# Patient Record
Sex: FEMALE | Race: WHITE | NOT HISPANIC OR LATINO | Employment: PART TIME | URBAN - METROPOLITAN AREA
[De-identification: names, ages, dates, MRNs, and addresses within clinical notes are randomized per-mention and may not be internally consistent; named-entity substitution may affect disease eponyms.]

---

## 2019-12-31 ENCOUNTER — CONSULT (OUTPATIENT)
Dept: PAIN MEDICINE | Facility: CLINIC | Age: 54
End: 2019-12-31
Payer: COMMERCIAL

## 2019-12-31 VITALS
DIASTOLIC BLOOD PRESSURE: 82 MMHG | HEART RATE: 72 BPM | WEIGHT: 279 LBS | HEIGHT: 67 IN | SYSTOLIC BLOOD PRESSURE: 122 MMHG | BODY MASS INDEX: 43.79 KG/M2

## 2019-12-31 DIAGNOSIS — M47.816 LUMBAR SPONDYLOSIS: ICD-10-CM

## 2019-12-31 DIAGNOSIS — M51.26 BULGE OF LUMBAR DISC WITHOUT MYELOPATHY: ICD-10-CM

## 2019-12-31 DIAGNOSIS — G89.29 CHRONIC BILATERAL LOW BACK PAIN WITHOUT SCIATICA: ICD-10-CM

## 2019-12-31 DIAGNOSIS — M54.16 LUMBAR RADICULOPATHY: ICD-10-CM

## 2019-12-31 DIAGNOSIS — G89.4 CHRONIC PAIN SYNDROME: Primary | ICD-10-CM

## 2019-12-31 DIAGNOSIS — M54.50 CHRONIC BILATERAL LOW BACK PAIN WITHOUT SCIATICA: ICD-10-CM

## 2019-12-31 PROBLEM — M51.36 BULGE OF LUMBAR DISC WITHOUT MYELOPATHY: Status: ACTIVE | Noted: 2019-12-31

## 2019-12-31 PROBLEM — M51.369 BULGE OF LUMBAR DISC WITHOUT MYELOPATHY: Status: ACTIVE | Noted: 2019-12-31

## 2019-12-31 PROCEDURE — 99244 OFF/OP CNSLTJ NEW/EST MOD 40: CPT | Performed by: NURSE PRACTITIONER

## 2019-12-31 RX ORDER — TRAMADOL HYDROCHLORIDE 50 MG/1
TABLET ORAL
COMMUNITY
Start: 2019-12-24 | End: 2020-08-26

## 2019-12-31 RX ORDER — CYCLOBENZAPRINE HCL 10 MG
TABLET ORAL
COMMUNITY
Start: 2019-02-14 | End: 2019-12-31

## 2019-12-31 RX ORDER — LEVOTHYROXINE SODIUM 88 UG/1
CAPSULE ORAL
COMMUNITY

## 2019-12-31 RX ORDER — GABAPENTIN 300 MG/1
CAPSULE ORAL
Qty: 90 CAPSULE | Refills: 1 | Status: SHIPPED | OUTPATIENT
Start: 2019-12-31 | End: 2020-02-18 | Stop reason: SDUPTHER

## 2019-12-31 RX ORDER — LISINOPRIL 20 MG/1
20 TABLET ORAL DAILY
COMMUNITY

## 2019-12-31 RX ORDER — SPIRONOLACTONE 100 MG/1
100 TABLET, FILM COATED ORAL DAILY
COMMUNITY

## 2019-12-31 RX ORDER — DOXYCYCLINE HYCLATE 100 MG/1
100 TABLET, DELAYED RELEASE ORAL 2 TIMES DAILY
COMMUNITY

## 2019-12-31 RX ORDER — ESCITALOPRAM OXALATE 20 MG/1
20 TABLET ORAL DAILY
COMMUNITY

## 2019-12-31 RX ORDER — IBUPROFEN 800 MG/1
TABLET ORAL EVERY 6 HOURS PRN
COMMUNITY

## 2019-12-31 NOTE — PATIENT INSTRUCTIONS
Gabapentin (By mouth)   Gabapentin (laly-a-PEN-tin)  Treats seizures and pain caused by shingles  Brand Name(s): ACTIVE-PAC with Gabapentin, Convenience Perry, Cyclo/Anna 10/300 Pack, FusePaq Fanatrex, Anna-V, Gralise, [address] Pack, Neurontin, SmartRx Anna Kit   There may be other brand names for this medicine  When This Medicine Should Not Be Used: This medicine is not right for everyone  Do not use it if you had an allergic reaction to gabapentin  How to Use This Medicine:   Capsule, Liquid, Tablet  · Take your medicine as directed  Your dose may need to be changed several times to find what works best for you  If you have epilepsy, do not allow more than 12 hours to pass between doses  · Capsule: Swallow the capsule whole with plenty of water  Do not open, crush, or chew it  · Gralise® tablet: Swallow the tablet whole   Do not crush, break, or chew it  · Neurontin® tablet: If you break a tablet into 2 pieces, use the second half as your next dose  If you don't use it within 28 days, throw it away  · Measure the oral liquid medicine with a marked measuring spoon, oral syringe, or medicine cup  · This medicine should come with a Medication Guide  Ask your pharmacist for a copy if you do not have one  · Missed dose: Take a dose as soon as you remember  If it is almost time for your next dose, wait until then and take a regular dose  Do not take extra medicine to make up for a missed dose  · Store the medicine in a closed container at room temperature, away from heat, moisture, and direct light  Store the Neurontin® oral liquid in the refrigerator  Do not freeze  Drugs and Foods to Avoid:   Ask your doctor or pharmacist before using any other medicine, including over-the-counter medicines, vitamins, and herbal products  · Some medicines can affect how gabapentin works   Tell your doctor if you also use any of the following:   ¨ Hydrocodone  ¨ Morphine  · If you take an antacid, wait at least 2 hours before you take gabapentin  · Tell your doctor if you use anything else that makes you sleepy  Some examples are allergy medicine, narcotic pain medicine, and alcohol  Warnings While Using This Medicine:   · Tell your doctor if you are pregnant or breastfeeding, or if you have kidney problems or are receiving dialysis  Tell your doctor if you have a history of depression or mental health problems  · This medicine may increase depression or thoughts of suicide  Tell your doctor right away if you start to feel more depressed or think about hurting yourself  · This medicine may cause a serious allergic reaction called multiorgan hypersensitivity, which can damage organs and be life-threatening  · Do not stop using this medicine suddenly  Your doctor will need to slowly decrease your dose before you stop it completely  If you take this medicine to prevent seizures, your seizures may return or occur more often if you stop this medicine suddenly  · This medicine may make you dizzy or drowsy  Do not drive or do anything else that could be dangerous until you know how this medicine affects you  · Tell any doctor or dentist who treats you that you are using this medicine  This medicine may affect certain medical test results  · Your doctor will check your progress and the effects of this medicine at regular visits  Keep all appointments  · Keep all medicine out of the reach of children  Never share your medicine with anyone    Possible Side Effects While Using This Medicine:   Call your doctor right away if you notice any of these side effects:  · Allergic reaction: Itching or hives, swelling in your face or hands, swelling or tingling in your mouth or throat, chest tightness, trouble breathing  · Behavior problems, aggression, restlessness, trouble concentrating, moodiness (especially in children)  · Blistering, peeling, red skin rash  · Change in how much or how often you urinate, bloody or cloudy urine,  · Chest pain, fast heartbeat, trouble breathing  · Dark urine or pale stools, nausea, vomiting, loss of appetite, stomach pain, yellow skin or eyes  · Fever, rash, swollen or tender glands in the neck, armpit, or groin  · Problems with coordination, shakiness, unsteadiness  · Rapid weight gain, swelling in your hands, ankles, or feet  · Unusual moods or behaviors, thoughts of hurting yourself, feeling depressed  If you notice these less serious side effects, talk with your doctor:   · Dizziness, drowsiness, sleepiness, tiredness  If you notice other side effects that you think are caused by this medicine, tell your doctor  Call your doctor for medical advice about side effects  You may report side effects to FDA at 2-427-FDA-9917  © 2017 [employer] Information is for End User's use only and may not be sold, redistributed or otherwise used for commercial purposes  The above information is an  only  It is not intended as medical advice for individual conditions or treatments  Talk to your doctor, nurse or pharmacist before following any medical regimen to see if it is safe and effective for you

## 2019-12-31 NOTE — PROGRESS NOTES
Assessment:  1  Chronic pain syndrome    2  Chronic bilateral low back pain without sciatica    3  Lumbar spondylosis    4  Bulge of lumbar disc without myelopathy    5  Lumbar radiculopathy        Plan:  While the patient was in the office today, I did have a thorough conversation with the patient regarding her chronic pain syndrome, symptoms, medication regimen, and treatment plan options  I did explain to the patient that at this point I am highly suspicious all multitude of her back pain as that is her biggest issue at this point is mostly coming from the underlying osteoarthritic component from the facet joints  The patient's low back pain persists despite time, relative rest, activity modification and therapy  Based on the patient's symptoms examination, I suspect that the pain is being generated by the lumbar facet joints  The facet joints are only one of many possible low-back pain generators  Unfortunately, studies have demonstrated that history and examination alone are unreliable  We will schedule the patient for diagnostic lumbar medial branch blockade using the double block paradigm  If the patient receives significant relief of appropriate duration with lidocaine 2%, we will confirm with Marcaine 0 75%  If the patient demonstrates appropriate response to medial branch blockade we will schedule for radiofrequency ablation to provide long-term relief  However, at this point the patient want to take home some information regarding the medial branch blocks and think about it as a possible future treatment option  In the meantime, I did explain to the patient that I feel there is a significant neuropathic and myofascial component to her pain symptoms and that she would benefit from a neuron membrane stabilizer such as gabapentin   I discussed with the patient the type of medication it is, how it works, and that it requires a titration process that is specific to each individual  I reviewed with the patient that it may take 3-4 weeks for the medication's effects to be noticed and that it should never be abruptly stopped  Possible side effects include but are not limited to; vertigo, lethargy, nausea, and edema of the extremities  Advised the patient to call our office if they experience any side effects  The patient verbalized an understanding  I advised the patient that she can continue to take the ibuprofen and tramadol as prescribed by her primary care provider, however, the hope is that once we get her on a stable dose of the gabapentin, she will not need the tramadol and only use the ibuprofen for flare up or breakthrough pain occasionally  The patient was agreeable and verbalized an understanding  I did encourage the patient to continue with her home exercise and stretching program and advised her to increase her activity, as tolerated, allowing pain be her guide  The patient was agreeable and verbalized an understanding  The patient is schedule a follow-up office visit in 6 weeks and at that point time we will re-evaluate her pain symptoms and treatment plan options  The patient was agreeable and verbalized an understanding  History of Present Illness: The patient is a 53 y o  female who presents for consultation in regards to Back Pain and Leg Pain  Symptoms have been present for approximately 20 years  Symptoms began without any precipitating injury or trauma  The patient reports that over the years with the different jobs that she has done and everything that is going on she has had on again off again chronic low back and leg pain and in the past has seen pain management without any significant or long-lasting relief and is even had a surgical evaluation with no good guarantee he of considerable pain relief  She does report she recently has completed a formalized 6 weeks of physical therapy with minimal improvement but is trying to be diligent about continuing with a home exercise and stretching program     Pain is reported to be 8 on the numeric rating scale  Symptoms are felt nearly constantly and worst in the morning, evening  Symptoms are characterized as shooting, sharp and dull/aching  Symptoms are associated with bilateral leg weakness  Aggravating factors include standing, bending, leaning forward, leaning bckward, walking, exercise and bowel movements  Relieving factors include: None  No change in symptoms with lying down, sitting, relaxation and coughing/sneezing  Treatments that have been helpful include prior injections including lumbar epidural steroid injections  , physical therapy, chiropractic manipulation and heat/ice  Medications to relieve symptoms include ibuprofen 800 mg b i d  P r n  for pain, tramadol 50 mg b i d  P r n  For pain, and cyclobenzaprine 10 mg half to a whole tablet t i d  P r n  For pain and spasm  The patient reports that she has not really been taking the cyclobenzaprine because she does not feel it is helpful and if she takes the tramadol, she is only taking it at nighttime to help her sleep  She reports that what she is using the most is ibuprofen typically only twice a day but if she could take it 3 times a day she would probably have better relief, but does know that is not good for her stomach and her kidneys take ibuprofen and such large doses for subtle extended period of time  The patient presents today to discuss her medication regimen and treatment plan options to see if there is a better way to manage her pain than what she is currently doing  Review of Systems:    Review of Systems   Constitutional: Negative for fever and unexpected weight change  HENT: Negative for trouble swallowing  Eyes: Negative for visual disturbance  Respiratory: Positive for shortness of breath and wheezing  Cardiovascular: Positive for palpitations  Negative for chest pain     Gastrointestinal: Negative for constipation, diarrhea, nausea and vomiting  Endocrine: Negative for cold intolerance, heat intolerance and polydipsia  Genitourinary: Negative for difficulty urinating and frequency  Musculoskeletal: Positive for gait problem  Negative for arthralgias, joint swelling and myalgias  Skin: Negative for rash  Neurological: Negative for dizziness, seizures, syncope, weakness and headaches  Hematological: Does not bruise/bleed easily  Psychiatric/Behavioral: Negative for dysphoric mood  All other systems reviewed and are negative  Past Medical History:   Diagnosis Date    Asthma     Diabetes ([facility] [address] )     Hypertension        History reviewed  No pertinent surgical history  Family History   Problem Relation Age of Onset    Hypertension Other     Heart disease Other     Cancer Other        Social History     Occupational History    Not on file   Tobacco Use    Smoking status: Never Smoker    Smokeless tobacco: Never Used   Substance and Sexual Activity    Alcohol use: Yes     Frequency: 2-3 times a week    Drug use: Never    Sexual activity: Not on file         Current Outpatient Medications:     cyclobenzaprine (FLEXERIL) 10 mg tablet, daily as needed  , Disp: , Rfl:     doxycycline (DORYX) 100 MG EC tablet, Take 100 mg by mouth 2 (two) times a day, Disp: , Rfl:     escitalopram (LEXAPRO) 20 mg tablet, Take 20 mg by mouth daily, Disp: , Rfl:     ibuprofen (MOTRIN) 800 mg tablet, Take by mouth every 6 (six) hours as needed for mild pain, Disp: , Rfl:     Levothyroxine Sodium 88 MCG CAPS, Take by mouth, Disp: , Rfl:     lisinopril (ZESTRIL) 20 mg tablet, Take 20 mg by mouth daily, Disp: , Rfl:     metFORMIN (GLUCOPHAGE) 500 mg tablet, Take 500 mg by mouth 2 (two) times a day with meals, Disp: , Rfl:     spironolactone (ALDACTONE) 100 mg tablet, Take 100 mg by mouth daily, Disp: , Rfl:     traMADol (ULTRAM) 50 mg tablet, , Disp: , Rfl:     gabapentin (NEURONTIN) 300 mg capsule, Take 1 PO HS x 1 week, then 2 PO HS x 1 week, then 3 PO HS , Disp: 90 capsule, Rfl: 1    Allergies   Allergen Reactions    Cefazolin        Physical Exam:    /82 (BP Location: Left arm, Patient Position: Sitting, Cuff Size: Large)   Pulse 72   Ht 5' 7" (1 702 m)   Wt 127 kg (279 lb)   BMI 43 70 kg/m²     Constitutional: normal, well developed, well nourished, alert, in no distress and non-toxic and no overt pain behavior  and overweight  Eyes: anicteric  HEENT: grossly intact  Neck: supple, symmetric, trachea midline and no masses   Pulmonary:even and unlabored  Cardiovascular:No edema or pitting edema present  Skin:Normal without rashes or lesions and well hydrated  Psychiatric:Mood and affect appropriate  Neurologic:Cranial Nerves II-XII grossly intact  Musculoskeletal:The patient's gait is slightly antalgic, but steady without the use of any assistive devices      Lumbar Spine Exam    Appearance:  Normal lordosis  Palpation/Tenderness:  left lumbar paraspinal tenderness  right lumbar paraspinal tenderness  left sacroiliac joint tenderness  right sacroiliac joint tenderness  Sensory:  no sensory deficits noted  Range of Motion:  Flexion:  Minimally limited  without pain  Extension:  Minimally limited  with pain  Rotation - Left:  Minimally limited  with pain  Rotation - Right:  Minimally limited  with pain  Motor Strength:  Left hip flexion:  5/5  Left hip extension:  5/5  Right hip flexion:  5/5  Right hip extension:  5/5  Left knee flexion:  5/5  Left knee extension:  5/5  Right knee flexion:  5/5  Right knee extension:  5/5  Left foot dorsiflexion:  5/5  Left foot plantar flexion:  5/5  Right foot dorsiflexion:  5/5  Right foot plantar flexion:  5/5  Reflexes:  Left Patellar:  1+   Right Patellar:  1+   Left Achilles:  1+   Right Achilles:  1+   Special Tests:  Left Straight Leg Test:  negative  Right Straight Leg Test:  negative  Left Jin's Maneuver:  negative  Right Jin's Maneuver: negative  Left Gaenslen's Test:  negative  Right Gaenslen's Test:  negative      Imaging    MRI LUMBAR SPINE W/O CONTRAST 8/9/19 @ [facility]  There has been some mild progression of the broad-based central and lateral disc bulge/herniation at the L3-L4 level  In addition there is bilateral degenerative facet disease  Combination results in lateral recess and neural foraminal stenosis, bilaterally, right greater then left  Early degenerative disc disease and a mildly bulging disc L4-L5 level  There is a persistent cluster or single multi septated cyst measuring approximately 3cm at the right ovary  This appears essentially unchanged when compared to the study of 2016  The evaluation of the cystic lesion is limited, on the large field-of-view localizing sequence  Further detailed evaluation could be obtained by follow-up pelvic ultrasound in order to compare to the study of 2016    MRI LUMBAR SPINE W/O CONTRAST 6/22/12 @ [facility]  There is multilevel degenerative disc disease  There is no spinal canal stenosis   There is mild bilateral neural foramen stenosis from L3 through L5

## 2020-02-18 ENCOUNTER — OFFICE VISIT (OUTPATIENT)
Dept: PAIN MEDICINE | Facility: CLINIC | Age: 55
End: 2020-02-18
Payer: COMMERCIAL

## 2020-02-18 VITALS
SYSTOLIC BLOOD PRESSURE: 126 MMHG | HEIGHT: 67 IN | WEIGHT: 270 LBS | HEART RATE: 58 BPM | BODY MASS INDEX: 42.38 KG/M2 | DIASTOLIC BLOOD PRESSURE: 80 MMHG

## 2020-02-18 DIAGNOSIS — G89.29 CHRONIC BILATERAL LOW BACK PAIN WITHOUT SCIATICA: ICD-10-CM

## 2020-02-18 DIAGNOSIS — M51.26 BULGE OF LUMBAR DISC WITHOUT MYELOPATHY: ICD-10-CM

## 2020-02-18 DIAGNOSIS — M54.50 CHRONIC BILATERAL LOW BACK PAIN WITHOUT SCIATICA: ICD-10-CM

## 2020-02-18 DIAGNOSIS — M54.16 LUMBAR RADICULOPATHY: ICD-10-CM

## 2020-02-18 DIAGNOSIS — M47.816 LUMBAR SPONDYLOSIS: ICD-10-CM

## 2020-02-18 DIAGNOSIS — G89.4 CHRONIC PAIN SYNDROME: Primary | ICD-10-CM

## 2020-02-18 PROCEDURE — 99214 OFFICE O/P EST MOD 30 MIN: CPT | Performed by: NURSE PRACTITIONER

## 2020-02-18 RX ORDER — GABAPENTIN 300 MG/1
CAPSULE ORAL
Qty: 90 CAPSULE | Refills: 1 | Status: SHIPPED | OUTPATIENT
Start: 2020-02-18 | End: 2020-04-20 | Stop reason: SDUPTHER

## 2020-02-18 NOTE — PROGRESS NOTES
Assessment:  1  Chronic pain syndrome    2  Chronic bilateral low back pain without sciatica    3  Lumbar radiculopathy    4  Lumbar spondylosis    5  Bulge of lumbar disc without myelopathy        Plan:  While the patient was in the office today, I did have a thorough conversation with the patient regarding their chronic low back pain syndrome, symptoms, medication regimen, and treatment plan  The patient's low back pain persists despite time, relative rest, activity modification and therapy  Based on the patient's symptoms examination, I suspect that the pain is being generated by the lumbar facet joints  The facet joints are only one of many possible low-back pain generators  Unfortunately, studies have demonstrated that history and examination alone are unreliable  We will schedule the patient for diagnostic lumbar medial branch blockade using the double block paradigm  If the patient receives significant relief of appropriate duration with lidocaine 2%, we will confirm with Marcaine 0 75%  If the patient demonstrates appropriate response to medial branch blockade we will schedule for radiofrequency ablation to provide long-term relief  Complete risks and benefits including bleeding, infection, tissue reaction, nerve injury and allergic reaction were discussed  The approach was demonstrated using models and literature was provided  Verbal and written consent was obtained  With regards to her medications, I discussed with the patient at this point since I agree it does not sound like she is having any side effects and currently denies actual suicidal ideations or thoughts, I feel it is reasonable to continue the gabapentin as prescribed for now because it is providing moderate improvement of her pain symptoms  The patient was agreeable and verbalized an understanding  The patient will follow-up in 8 weeks for medication prescription refill and reevaluation   The patient was advised to contact the office should their symptoms worsen in the interim  The patient was agreeable and verbalized an understanding  History of Present Illness: The patient is a 53 y o  female last seen on 12/31/19 who presents for a follow up office visit in regards to chronic pain syndrome secondary to lumbar spondylosis, with a bulging lumbar disc and radiculopathy  The patient currently reports that since her last office visit overall she does feel that her pain symptoms have slightly improved as she does feel that the addition of the gabapentin has been helpful as her pain is not as severe as it was, especially the leg pain  However, she does continue with the chronic low back pain and since her last office visit and she has continued to review the previously discussed lumbar medial branch blocks at this point would like to continue the gabapentin and proceed with the previously discussed medial branch blocks  Current pain medications includes:  Gabapentin 900 mg daily  The patient reports that this regimen is providing 50% pain relief  The patient is reporting sleepiness from this pain medication regimen  She does report that although she is not having actual suicidal thoughts or ideations where she wants to kill herself or hurt herself, she does feel that since starting the gabapentin she has been thinking about her own death more with regards to where she would be buried and the whole process, however, currently denies any suicidal thoughts, ideations, or plans  She just reports that she noticed she has been thinking about her own death and end of her life more than usual and is not fully convinced it is because of the gabapentin as she would like to continue to take it because it is helping her pain  I have personally reviewed and/or updated the patient's past medical history, past surgical history, family history, social history, current medications, allergies, and vital signs today         Review of Systems:    Review of Systems   Respiratory: Negative for shortness of breath  Cardiovascular: Negative for chest pain  Gastrointestinal: Negative for constipation, diarrhea, nausea and vomiting  Musculoskeletal: Negative for arthralgias, gait problem, joint swelling and myalgias  Skin: Negative for rash  Neurological: Negative for dizziness, seizures and weakness  All other systems reviewed and are negative  Past Medical History:   Diagnosis Date    Asthma     Diabetes ([facility] [address] )     Hypertension        History reviewed  No pertinent surgical history      Family History   Problem Relation Age of Onset    Hypertension Other     Heart disease Other     Cancer Other        Social History     Occupational History    Not on file   Tobacco Use    Smoking status: Never Smoker    Smokeless tobacco: Never Used   Substance and Sexual Activity    Alcohol use: Yes     Frequency: 2-3 times a week    Drug use: Never    Sexual activity: Not on file         Current Outpatient Medications:     doxycycline (DORYX) 100 MG EC tablet, Take 100 mg by mouth 2 (two) times a day, Disp: , Rfl:     escitalopram (LEXAPRO) 20 mg tablet, Take 20 mg by mouth daily, Disp: , Rfl:     gabapentin (NEURONTIN) 300 mg capsule, Take 3 PO HS , Disp: 90 capsule, Rfl: 1    ibuprofen (MOTRIN) 800 mg tablet, Take by mouth every 6 (six) hours as needed for mild pain, Disp: , Rfl:     Levothyroxine Sodium 88 MCG CAPS, Take by mouth, Disp: , Rfl:     lisinopril (ZESTRIL) 20 mg tablet, Take 20 mg by mouth daily, Disp: , Rfl:     metFORMIN (GLUCOPHAGE) 500 mg tablet, Take 500 mg by mouth 2 (two) times a day with meals, Disp: , Rfl:     spironolactone (ALDACTONE) 100 mg tablet, Take 100 mg by mouth daily, Disp: , Rfl:     traMADol (ULTRAM) 50 mg tablet, , Disp: , Rfl:     Allergies   Allergen Reactions    Cefazolin        Physical Exam:    /80 (BP Location: Left arm, Patient Position: Sitting, Cuff Size: Standard)   Pulse 58   Ht 5' 7" (1 702 m)   Wt 122 kg (270 lb)   BMI 42 29 kg/m²     Constitutional:normal, well developed, well nourished, alert, in no distress and non-toxic and no overt pain behavior  and overweight  Eyes:anicteric  HEENT:grossly intact  Neck:supple, symmetric, trachea midline and no masses   Pulmonary:even and unlabored  Cardiovascular:No edema or pitting edema present  Skin:Normal without rashes or lesions and well hydrated  Psychiatric:Mood and affect appropriate  Neurologic:Cranial Nerves II-XII grossly intact  Musculoskeletal:The patient's gait is slightly antalgic, but steady without the use of any assistive devices    Pain was noted upon exam of the lumbosacral spine with extension greater than flexion and pain noted bilaterally in the L3 through L5 facet joints with facet loading upon exam       Imaging  FL spine and pain procedure    (Results Pending)         Orders Placed This Encounter   Procedures    FL spine and pain procedure

## 2020-02-18 NOTE — PATIENT INSTRUCTIONS
Lumbar Radiofrequency Ablation   WHAT YOU NEED TO KNOW:   What do I need to know about lumbar radiofrequency ablation? Lumbar radiofrequency ablation (RFA) is a procedure used to treat facet joint pain in your lower back  Facet joints are found at the back of each vertebra  A needle electrode is used to send electrical currents to the nerves in your facet joint  The electrical currents create heat that damages the nerve so it cannot send pain signals  How do I prepare for lumbar RFA? Your healthcare provider will talk to you about how to prepare for this procedure  He may tell you not to eat or drink anything after midnight on the day of your procedure  He will tell you what medicines to take or not take on the day of your procedure  What will happen during lumbar RFA? · You will lie on your stomach  You will be given local anesthesia to numb the area of your back where the needle electrode will be inserted  You may be given a sedative to help keep you relaxed  You may still feel pressure or pushing during the procedure, but you should not feel any pain  Your healthcare provider will use fluoroscopy (a type of x-ray) to guide the needle electrode to the nerves near your facet joint  · Your healthcare provider may touch the affected nerve to make sure the needle electrode is in the right place  You will feel tingling or pressure when he does this  He will then apply local anesthesia to the nerve to numb it  This will prevent you from feeling pain when he applies heat to the nerve  Your healthcare provider will then apply heat to the nerve using the needle electrode  He may need to apply heat to more than one nerve  He will remove the needle electrode and apply a bandage over the area  What are the risks of lumbar RFA? You may have pain, numbness, tingling, or burning in the area where the lumbar RFA was done  These normally go away within 6 weeks  The needle electrode may injure your spinal nerves   This may cause permanent leg weakness or nerve pain  CARE AGREEMENT:   You have the right to help plan your care  Learn about your health condition and how it may be treated  Discuss treatment options with your caregivers to decide what care you want to receive  You always have the right to refuse treatment  The above information is an  only  It is not intended as medical advice for individual conditions or treatments  Talk to your doctor, nurse or pharmacist before following any medical regimen to see if it is safe and effective for you  © 2017 [employer]  Information is for End User's use only and may not be sold, redistributed or otherwise used for commercial purposes  All illustrations and images included in CareNotes® are the copyrighted property of [employer]  or [employer]

## 2020-02-28 ENCOUNTER — HOSPITAL ENCOUNTER (OUTPATIENT)
Dept: RADIOLOGY | Facility: CLINIC | Age: 55
Discharge: HOME | End: 2020-02-28
Admitting: ANESTHESIOLOGY
Payer: COMMERCIAL

## 2020-02-28 VITALS
RESPIRATION RATE: 20 BRPM | OXYGEN SATURATION: 98 % | SYSTOLIC BLOOD PRESSURE: 130 MMHG | HEART RATE: 96 BPM | TEMPERATURE: 97.9 F | DIASTOLIC BLOOD PRESSURE: 91 MMHG

## 2020-02-28 DIAGNOSIS — G89.29 CHRONIC BILATERAL LOW BACK PAIN WITHOUT SCIATICA: ICD-10-CM

## 2020-02-28 DIAGNOSIS — M47.816 LUMBAR SPONDYLOSIS: ICD-10-CM

## 2020-02-28 DIAGNOSIS — M54.50 CHRONIC BILATERAL LOW BACK PAIN WITHOUT SCIATICA: ICD-10-CM

## 2020-02-28 PROCEDURE — 64493 INJ PARAVERT F JNT L/S 1 LEV: CPT | Performed by: ANESTHESIOLOGY

## 2020-02-28 PROCEDURE — 64494 INJ PARAVERT F JNT L/S 2 LEV: CPT | Performed by: ANESTHESIOLOGY

## 2020-02-28 RX ORDER — BUPIVACAINE HCL/PF 2.5 MG/ML
10 VIAL (ML) INJECTION ONCE
Status: COMPLETED | OUTPATIENT
Start: 2020-02-28 | End: 2020-02-28

## 2020-02-28 RX ADMIN — BUPIVACAINE HYDROCHLORIDE 6 ML: 2.5 INJECTION, SOLUTION EPIDURAL; INFILTRATION; INTRACAUDAL at 10:34

## 2020-02-28 NOTE — H&P
History of Present Illness: The patient is a 53 y o  female who presents with complaints of low back pain  Patient Active Problem List   Diagnosis    Chronic bilateral low back pain without sciatica    Chronic pain syndrome    Lumbar spondylosis    Bulge of lumbar disc without myelopathy    Lumbar radiculopathy       Past Medical History:   Diagnosis Date    Asthma     Diabetes ([address] )     Hypertension        No past surgical history on file  Current Outpatient Medications:     doxycycline (DORYX) 100 MG EC tablet, Take 100 mg by mouth 2 (two) times a day, Disp: , Rfl:     escitalopram (LEXAPRO) 20 mg tablet, Take 20 mg by mouth daily, Disp: , Rfl:     gabapentin (NEURONTIN) 300 mg capsule, Take 3 PO HS , Disp: 90 capsule, Rfl: 1    ibuprofen (MOTRIN) 800 mg tablet, Take by mouth every 6 (six) hours as needed for mild pain, Disp: , Rfl:     Levothyroxine Sodium 88 MCG CAPS, Take by mouth, Disp: , Rfl:     lisinopril (ZESTRIL) 20 mg tablet, Take 20 mg by mouth daily, Disp: , Rfl:     metFORMIN (GLUCOPHAGE) 500 mg tablet, Take 500 mg by mouth 2 (two) times a day with meals, Disp: , Rfl:     spironolactone (ALDACTONE) 100 mg tablet, Take 100 mg by mouth daily, Disp: , Rfl:     traMADol (ULTRAM) 50 mg tablet, , Disp: , Rfl:     Current Facility-Administered Medications:     bupivacaine (PF) (MARCAINE) 0 25 % injection 10 mL, 10 mL, Perineural, Once, Darío Walls DO    Allergies   Allergen Reactions    Cefazolin        Physical Exam:   General: Awake, Alert, Oriented x 3, Mood and affect appropriate  Respiratory: Respirations even and unlabored  Cardiovascular: Peripheral pulses intact; no edema  Musculoskeletal Exam:  Pain with lumbar extension    ASA Score: II         Assessment:   1  Chronic bilateral low back pain without sciatica    2   Lumbar spondylosis        Plan: B/L L3-L5 MBB

## 2020-02-28 NOTE — DISCHARGE INSTRUCTIONS

## 2020-03-02 ENCOUNTER — TELEPHONE (OUTPATIENT)
Dept: RADIOLOGY | Facility: CLINIC | Age: 55
End: 2020-03-02

## 2020-03-03 NOTE — TELEPHONE ENCOUNTER
Pt rtc and scheduled for 3/16/2020, reviewed all pre-procedural instructions with pt and pt verbalized understanding

## 2020-03-16 ENCOUNTER — HOSPITAL ENCOUNTER (OUTPATIENT)
Dept: RADIOLOGY | Facility: CLINIC | Age: 55
Discharge: HOME | End: 2020-03-16
Attending: ANESTHESIOLOGY | Admitting: ANESTHESIOLOGY
Payer: COMMERCIAL

## 2020-03-16 VITALS
DIASTOLIC BLOOD PRESSURE: 84 MMHG | HEART RATE: 69 BPM | OXYGEN SATURATION: 98 % | SYSTOLIC BLOOD PRESSURE: 138 MMHG | RESPIRATION RATE: 20 BRPM | TEMPERATURE: 98.1 F

## 2020-03-16 DIAGNOSIS — M54.50 LOW BACK PAIN: ICD-10-CM

## 2020-03-16 DIAGNOSIS — M47.816 LUMBAR SPONDYLOSIS: ICD-10-CM

## 2020-03-16 PROCEDURE — 64493 INJ PARAVERT F JNT L/S 1 LEV: CPT | Performed by: ANESTHESIOLOGY

## 2020-03-16 PROCEDURE — 64494 INJ PARAVERT F JNT L/S 2 LEV: CPT | Performed by: ANESTHESIOLOGY

## 2020-03-16 RX ORDER — BUPIVACAINE HYDROCHLORIDE 7.5 MG/ML
10 INJECTION, SOLUTION EPIDURAL; RETROBULBAR ONCE
Status: COMPLETED | OUTPATIENT
Start: 2020-03-16 | End: 2020-03-16

## 2020-03-16 RX ADMIN — BUPIVACAINE HYDROCHLORIDE 6 ML: 7.5 INJECTION, SOLUTION EPIDURAL; RETROBULBAR at 11:06

## 2020-03-16 NOTE — DISCHARGE INSTRUCTIONS

## 2020-03-16 NOTE — H&P
History of Present Illness: The patient is a 53 y o  female who presents with complaints of  Low back  pain  Patient Active Problem List   Diagnosis    Chronic bilateral low back pain without sciatica    Chronic pain syndrome    Lumbar spondylosis    Bulge of lumbar disc without myelopathy    Lumbar radiculopathy       Past Medical History:   Diagnosis Date    Asthma     Diabetes ([address] )     Hypertension        No past surgical history on file        Current Outpatient Medications:     doxycycline (DORYX) 100 MG EC tablet, Take 100 mg by mouth 2 (two) times a day, Disp: , Rfl:     escitalopram (LEXAPRO) 20 mg tablet, Take 20 mg by mouth daily, Disp: , Rfl:     gabapentin (NEURONTIN) 300 mg capsule, Take 3 PO HS , Disp: 90 capsule, Rfl: 1    ibuprofen (MOTRIN) 800 mg tablet, Take by mouth every 6 (six) hours as needed for mild pain, Disp: , Rfl:     Levothyroxine Sodium 88 MCG CAPS, Take by mouth, Disp: , Rfl:     lisinopril (ZESTRIL) 20 mg tablet, Take 20 mg by mouth daily, Disp: , Rfl:     metFORMIN (GLUCOPHAGE) 500 mg tablet, Take 500 mg by mouth 2 (two) times a day with meals, Disp: , Rfl:     spironolactone (ALDACTONE) 100 mg tablet, Take 100 mg by mouth daily, Disp: , Rfl:     traMADol (ULTRAM) 50 mg tablet, , Disp: , Rfl:     Current Facility-Administered Medications:     bupivacaine (PF) (MARCAINE) 0 75 % injection 10 mL, 10 mL, Other, Once, Darío Walls,     Allergies   Allergen Reactions    Cefazolin        Physical Exam:   Vitals:    03/16/20 1058   BP: 137/85   Pulse: 66   Resp: 20   Temp: 98 1 °F (36 7 °C)   SpO2: 98%     General: Awake, Alert, Oriented x 3, Mood and affect appropriate  Respiratory: Respirations even and unlabored  Cardiovascular: Peripheral pulses intact; no edema  Musculoskeletal Exam: Pain with lumbar extension    ASA Score: III    Patient/Chart Verification  Patient ID Verified: Verbal  ID Band Applied: No  Consents Confirmed: Procedural  H&P( within 30 days) Verified: To be obtained in the Pre-Procedure area  Interval H&P(within 24 hr) Complete (required for Outpatients and Surgery Admit only): To be obtained in the Pre-Procedure area  Allergies Reviewed: Yes  Anticoag/NSAID held?: No  Currently on antibiotics?: No  Pre-op Lab/Test Results Available: N/A  Pregnancy Lab Collected: N/A comment    Assessment:   1  Lumbar spondylosis    2   Low back pain        Plan: B/L L3-L5 MBB #2

## 2020-03-17 ENCOUNTER — TELEPHONE (OUTPATIENT)
Dept: PAIN MEDICINE | Facility: MEDICAL CENTER | Age: 55
End: 2020-03-17

## 2020-03-17 NOTE — TELEPHONE ENCOUNTER
S/w pt, advised that the pain diary has been received  Once it has been reviewed, the pt will get a cb from the nurse or the  to discuss the next step  Pt verbalized understanding and appreciation

## 2020-03-19 ENCOUNTER — TELEPHONE (OUTPATIENT)
Dept: RADIOLOGY | Facility: CLINIC | Age: 55
End: 2020-03-19

## 2020-03-19 NOTE — TELEPHONE ENCOUNTER
S/w pt in regards to RFA procedure being on hold for now, and that once it has been determined to safe to move forward advised pt  will be in contact to schedule her RFA (rt l3-5, then lt l3-5)  Pt verbalized understanding and appreciation

## 2020-03-22 DIAGNOSIS — G89.29 CHRONIC BILATERAL LOW BACK PAIN WITHOUT SCIATICA: ICD-10-CM

## 2020-03-22 DIAGNOSIS — M54.16 LUMBAR RADICULOPATHY: ICD-10-CM

## 2020-03-22 DIAGNOSIS — M54.50 CHRONIC BILATERAL LOW BACK PAIN WITHOUT SCIATICA: ICD-10-CM

## 2020-03-22 RX ORDER — GABAPENTIN 300 MG/1
CAPSULE ORAL
Qty: 90 CAPSULE | Refills: 1 | OUTPATIENT
Start: 2020-03-22

## 2020-04-03 ENCOUNTER — TELEPHONE (OUTPATIENT)
Dept: PAIN MEDICINE | Facility: CLINIC | Age: 55
End: 2020-04-03

## 2020-04-07 DIAGNOSIS — M54.16 LUMBAR RADICULOPATHY: ICD-10-CM

## 2020-04-07 DIAGNOSIS — G89.29 CHRONIC BILATERAL LOW BACK PAIN WITHOUT SCIATICA: ICD-10-CM

## 2020-04-07 DIAGNOSIS — M54.50 CHRONIC BILATERAL LOW BACK PAIN WITHOUT SCIATICA: ICD-10-CM

## 2020-04-07 RX ORDER — GABAPENTIN 300 MG/1
CAPSULE ORAL
Qty: 90 CAPSULE | Refills: 1 | OUTPATIENT
Start: 2020-04-07

## 2020-04-20 ENCOUNTER — TELEMEDICINE (OUTPATIENT)
Dept: PAIN MEDICINE | Facility: CLINIC | Age: 55
End: 2020-04-20
Payer: COMMERCIAL

## 2020-04-20 DIAGNOSIS — G89.4 CHRONIC PAIN SYNDROME: Primary | ICD-10-CM

## 2020-04-20 DIAGNOSIS — M47.816 LUMBAR SPONDYLOSIS: ICD-10-CM

## 2020-04-20 DIAGNOSIS — M54.16 LUMBAR RADICULOPATHY: ICD-10-CM

## 2020-04-20 DIAGNOSIS — M54.50 CHRONIC BILATERAL LOW BACK PAIN WITHOUT SCIATICA: ICD-10-CM

## 2020-04-20 DIAGNOSIS — G89.29 CHRONIC BILATERAL LOW BACK PAIN WITHOUT SCIATICA: ICD-10-CM

## 2020-04-20 DIAGNOSIS — M51.26 BULGE OF LUMBAR DISC WITHOUT MYELOPATHY: ICD-10-CM

## 2020-04-20 PROCEDURE — G2012 BRIEF CHECK IN BY MD/QHP: HCPCS | Performed by: NURSE PRACTITIONER

## 2020-04-20 RX ORDER — GABAPENTIN 300 MG/1
CAPSULE ORAL
Qty: 90 CAPSULE | Refills: 2 | Status: SHIPPED | OUTPATIENT
Start: 2020-04-20 | End: 2020-08-26

## 2020-04-21 ENCOUNTER — TELEPHONE (OUTPATIENT)
Dept: PAIN MEDICINE | Facility: CLINIC | Age: 55
End: 2020-04-21

## 2020-07-10 ENCOUNTER — HOSPITAL ENCOUNTER (OUTPATIENT)
Dept: RADIOLOGY | Facility: CLINIC | Age: 55
Discharge: HOME | End: 2020-07-10
Attending: ANESTHESIOLOGY | Admitting: ANESTHESIOLOGY
Payer: COMMERCIAL

## 2020-07-10 ENCOUNTER — TELEPHONE (OUTPATIENT)
Dept: PAIN MEDICINE | Facility: CLINIC | Age: 55
End: 2020-07-10

## 2020-07-10 VITALS
SYSTOLIC BLOOD PRESSURE: 126 MMHG | OXYGEN SATURATION: 98 % | RESPIRATION RATE: 20 BRPM | HEART RATE: 89 BPM | TEMPERATURE: 98.7 F | DIASTOLIC BLOOD PRESSURE: 70 MMHG

## 2020-07-10 DIAGNOSIS — M47.816 LUMBAR SPONDYLOSIS: ICD-10-CM

## 2020-07-10 DIAGNOSIS — M54.50 LOW BACK PAIN: ICD-10-CM

## 2020-07-10 PROCEDURE — 64635 DESTROY LUMB/SAC FACET JNT: CPT | Performed by: ANESTHESIOLOGY

## 2020-07-10 PROCEDURE — 64636 DESTROY L/S FACET JNT ADDL: CPT | Performed by: ANESTHESIOLOGY

## 2020-07-10 RX ORDER — LIDOCAINE HYDROCHLORIDE 10 MG/ML
5 INJECTION, SOLUTION EPIDURAL; INFILTRATION; INTRACAUDAL; PERINEURAL ONCE
Status: COMPLETED | OUTPATIENT
Start: 2020-07-10 | End: 2020-07-10

## 2020-07-10 RX ADMIN — LIDOCAINE HYDROCHLORIDE 5 ML: 10 INJECTION, SOLUTION EPIDURAL; INFILTRATION; INTRACAUDAL; PERINEURAL at 11:18

## 2020-07-10 RX ADMIN — LIDOCAINE HYDROCHLORIDE 4 ML: 20 INJECTION, SOLUTION EPIDURAL; INFILTRATION; INTRACAUDAL at 11:18

## 2020-07-10 NOTE — DISCHARGE INSTRUCTIONS

## 2020-07-10 NOTE — H&P
History of Present Illness: The patient is a 53 y o  female who presents with complaints of low back pain  Patient Active Problem List   Diagnosis    Chronic bilateral low back pain without sciatica    Chronic pain syndrome    Lumbar spondylosis    Bulge of lumbar disc without myelopathy    Lumbar radiculopathy       Past Medical History:   Diagnosis Date    Asthma     [address] [address]     Hypertension        No past surgical history on file  Current Outpatient Medications:     doxycycline (DORYX) 100 MG EC tablet, Take 100 mg by mouth 2 (two) times a day, Disp: , Rfl:     escitalopram (LEXAPRO) 20 mg tablet, Take 20 mg by mouth daily, Disp: , Rfl:     gabapentin (NEURONTIN) 300 mg capsule, Take 3 PO HS , Disp: 90 capsule, Rfl: 2    ibuprofen (MOTRIN) 800 mg tablet, Take by mouth every 6 (six) hours as needed for mild pain, Disp: , Rfl:     Levothyroxine Sodium 88 MCG CAPS, Take by mouth, Disp: , Rfl:     lisinopril (ZESTRIL) 20 mg tablet, Take 20 mg by mouth daily, Disp: , Rfl:     metFORMIN (GLUCOPHAGE) 500 mg tablet, Take 500 mg by mouth 2 (two) times a day with meals, Disp: , Rfl:     spironolactone (ALDACTONE) 100 mg tablet, Take 100 mg by mouth daily, Disp: , Rfl:     traMADol (ULTRAM) 50 mg tablet, , Disp: , Rfl:     Current Facility-Administered Medications:     lidocaine (PF) (XYLOCAINE-MPF) 1 % injection 5 mL, 5 mL, Other, Once, Darío Walls DO    lidocaine (PF) (XYLOCAINE-MPF) 2 % injection 5 mL, 5 mL, Perineural, Once, Darío Walls DO    Allergies   Allergen Reactions    Cefazolin        Physical Exam:   General: Awake, Alert, Oriented x 3, Mood and affect appropriate  Respiratory: Respirations even and unlabored  Cardiovascular: Peripheral pulses intact; no edema  Musculoskeletal Exam:  Pain with lumbar extension    ASA Score: III         Assessment:   1  Lumbar spondylosis    2   Low back pain        Plan: RT L3-5 RFA

## 2020-07-10 NOTE — TELEPHONE ENCOUNTER
Patient is S/P a Right L3-L5 RFA c/ SL on 7/10/20  Next RFA scheduled for 7/24  Next OVS scheduled for 8/26  Please call on 7/13 post OPRO   Thanks

## 2020-07-13 NOTE — TELEPHONE ENCOUNTER
S/w pt, stated that she has + relief s/p rfa of 7/10/2020  Advised pt to allow 2-6 weeks for the full effect  Cb if s/s infection present: redness, drainage, swelling at the site or fever 100+, or if a sunburn like sensation in the area of the procedure presents  Ok to continue w/ rest, ice / heat, medication as prescribed  Fu on 7/24/2020 for the L sided procedure  Pt stated that she received a phone call from her insurance company stating that her procedure was denied because she got less than 75% improvement with the first 2 procedures  S/w [facility] surg coord earlier  Advised pt, will make SL aware of above and forward her concerns to [address] [place] [address] for fu  Pt verbalized understanding and appreciation

## 2020-07-13 NOTE — TELEPHONE ENCOUNTER
Pt called stating she received a call from insurance regaring her procedure   Pt has a few questions     Pt can be reached at [phone]

## 2020-07-15 ENCOUNTER — TELEPHONE (OUTPATIENT)
Dept: RADIOLOGY | Facility: CLINIC | Age: 55
End: 2020-07-15

## 2020-07-15 NOTE — TELEPHONE ENCOUNTER
[employer] denying patient's RFA because "There is no documentation of at least 75 percent pain relief from your medial branch block injection on 3/10/20"     Both pain diaries faxed over showed % relief  Attempted to call [employer] to reconsider decision, however ordering provider must do so       Phone number: [phone]  Reference# [id number]

## 2020-07-17 DIAGNOSIS — M54.50 CHRONIC BILATERAL LOW BACK PAIN WITHOUT SCIATICA: ICD-10-CM

## 2020-07-17 DIAGNOSIS — G89.29 CHRONIC BILATERAL LOW BACK PAIN WITHOUT SCIATICA: ICD-10-CM

## 2020-07-17 DIAGNOSIS — M54.16 LUMBAR RADICULOPATHY: ICD-10-CM

## 2020-07-17 RX ORDER — GABAPENTIN 300 MG/1
CAPSULE ORAL
Qty: 90 CAPSULE | Refills: 1 | OUTPATIENT
Start: 2020-07-17

## 2020-07-23 NOTE — TELEPHONE ENCOUNTER
Received message from  in [facility] 07/24/20 Dr Jesika Ramsay is off and pts RFA was still on schedule (someone had left 2 messages for her)  Pt says she returned the call this morning and someone at call center confirmed her appt for tomorrow so she was going to come  Pt disappointed we had to reschedule, soonest appt is 08/17/20, advised pt I would let  in [facility] know that she'd like a sooner appt if it becomes available  Pt appreciative of call

## 2020-08-03 DIAGNOSIS — M54.50 CHRONIC BILATERAL LOW BACK PAIN WITHOUT SCIATICA: ICD-10-CM

## 2020-08-03 DIAGNOSIS — G89.29 CHRONIC BILATERAL LOW BACK PAIN WITHOUT SCIATICA: ICD-10-CM

## 2020-08-03 DIAGNOSIS — M54.16 LUMBAR RADICULOPATHY: ICD-10-CM

## 2020-08-03 RX ORDER — GABAPENTIN 300 MG/1
CAPSULE ORAL
Qty: 90 CAPSULE | Refills: 1 | OUTPATIENT
Start: 2020-08-03

## 2020-08-17 ENCOUNTER — TELEPHONE (OUTPATIENT)
Dept: RADIOLOGY | Facility: CLINIC | Age: 55
End: 2020-08-17

## 2020-08-17 ENCOUNTER — HOSPITAL ENCOUNTER (OUTPATIENT)
Dept: RADIOLOGY | Facility: CLINIC | Age: 55
Discharge: HOME | End: 2020-08-17
Attending: ANESTHESIOLOGY | Admitting: ANESTHESIOLOGY
Payer: COMMERCIAL

## 2020-08-17 VITALS
HEART RATE: 94 BPM | DIASTOLIC BLOOD PRESSURE: 86 MMHG | RESPIRATION RATE: 22 BRPM | SYSTOLIC BLOOD PRESSURE: 128 MMHG | OXYGEN SATURATION: 96 % | TEMPERATURE: 97.3 F

## 2020-08-17 DIAGNOSIS — M54.50 LOW BACK PAIN: ICD-10-CM

## 2020-08-17 DIAGNOSIS — M47.816 LUMBAR SPONDYLOSIS: ICD-10-CM

## 2020-08-17 PROCEDURE — 64636 DESTROY L/S FACET JNT ADDL: CPT | Performed by: ANESTHESIOLOGY

## 2020-08-17 PROCEDURE — 64635 DESTROY LUMB/SAC FACET JNT: CPT | Performed by: ANESTHESIOLOGY

## 2020-08-17 RX ORDER — LIDOCAINE HYDROCHLORIDE 10 MG/ML
5 INJECTION, SOLUTION EPIDURAL; INFILTRATION; INTRACAUDAL; PERINEURAL ONCE
Status: COMPLETED | OUTPATIENT
Start: 2020-08-17 | End: 2020-08-17

## 2020-08-17 RX ADMIN — LIDOCAINE HYDROCHLORIDE 5 ML: 10 INJECTION, SOLUTION EPIDURAL; INFILTRATION; INTRACAUDAL; PERINEURAL at 10:57

## 2020-08-17 RX ADMIN — LIDOCAINE HYDROCHLORIDE 4 ML: 20 INJECTION, SOLUTION EPIDURAL; INFILTRATION; INTRACAUDAL at 10:59

## 2020-08-17 NOTE — DISCHARGE INSTRUCTIONS

## 2020-08-17 NOTE — H&P
History of Present Illness: The patient is a 53 y o  female who presents with complaints of low back and leg pain  Patient Active Problem List   Diagnosis    Chronic bilateral low back pain without sciatica    Chronic pain syndrome    Lumbar spondylosis    Bulge of lumbar disc without myelopathy    Lumbar radiculopathy       Past Medical History:   Diagnosis Date    Asthma     Diabetes ([address] )     Hypertension        No past surgical history on file        Current Outpatient Medications:     doxycycline (DORYX) 100 MG EC tablet, Take 100 mg by mouth 2 (two) times a day, Disp: , Rfl:     escitalopram (LEXAPRO) 20 mg tablet, Take 20 mg by mouth daily, Disp: , Rfl:     gabapentin (NEURONTIN) 300 mg capsule, Take 3 PO HS , Disp: 90 capsule, Rfl: 2    ibuprofen (MOTRIN) 800 mg tablet, Take by mouth every 6 (six) hours as needed for mild pain, Disp: , Rfl:     Levothyroxine Sodium 88 MCG CAPS, Take by mouth, Disp: , Rfl:     lisinopril (ZESTRIL) 20 mg tablet, Take 20 mg by mouth daily, Disp: , Rfl:     metFORMIN (GLUCOPHAGE) 500 mg tablet, Take 500 mg by mouth 2 (two) times a day with meals, Disp: , Rfl:     spironolactone (ALDACTONE) 100 mg tablet, Take 100 mg by mouth daily, Disp: , Rfl:     traMADol (ULTRAM) 50 mg tablet, , Disp: , Rfl:     Current Facility-Administered Medications:     lidocaine (PF) (XYLOCAINE-MPF) 1 % injection 5 mL, 5 mL, Other, Once, Darío Walls DO    lidocaine (PF) (XYLOCAINE-MPF) 2 % injection 5 mL, 5 mL, Perineural, Once, Darío Walls DO    Allergies   Allergen Reactions    Cefazolin        Physical Exam:   Vitals:    08/17/20 1047   BP: 138/86   Pulse: 97   Resp: 20   Temp: (!) 97 3 °F (36 3 °C)   SpO2: 98%     General: Awake, Alert, Oriented x 3, Mood and affect appropriate  Respiratory: Respirations even and unlabored  Cardiovascular: Peripheral pulses intact; no edema  Musculoskeletal Exam:  Decreased range of motion lumbar spine    ASA Score: III    Patient/Chart Verification  Patient ID Verified: Verbal  ID Band Applied: No  Consents Confirmed: Procedural  H&P( within 30 days) Verified: To be obtained in the Pre-Procedure area  Interval H&P(within 24 hr) Complete (required for Outpatients and Surgery Admit only): To be obtained in the Pre-Procedure area  Allergies Reviewed: Yes  Anticoag/NSAID held?: NA  Currently on antibiotics?: (S) Yes(long term for Hydronitis)  Pregnancy denied?: NA    Assessment:   1  Lumbar spondylosis    2   Low back pain        Plan: LT L3-5 RFA

## 2020-08-18 NOTE — TELEPHONE ENCOUNTER
S/w pt, stated that she does have some soreness s/t yesterday's procedure  Continues w/ ice  Pt stated that her chronic pain is feeling better  Advised pt to allow 24 - 48 hrs for pain s/t procedure to improve, allow 2-6 weeks for the full effect  Cb if s/s infection present: redness, drainage, swelling at the site or fever 100+, or if a sunburn like sensation in the area of the procedure presents  Ok to continue w/ rest, ice / heat, medication as prescribed  Fu as scheduled on 8/26 w/ DG  Pt verbalized understanding and appreciation

## 2020-08-26 ENCOUNTER — OFFICE VISIT (OUTPATIENT)
Dept: PAIN MEDICINE | Facility: CLINIC | Age: 55
End: 2020-08-26
Payer: COMMERCIAL

## 2020-08-26 VITALS
HEIGHT: 67 IN | WEIGHT: 254 LBS | SYSTOLIC BLOOD PRESSURE: 132 MMHG | BODY MASS INDEX: 39.87 KG/M2 | HEART RATE: 98 BPM | DIASTOLIC BLOOD PRESSURE: 84 MMHG | TEMPERATURE: 98.3 F

## 2020-08-26 DIAGNOSIS — M47.816 LUMBAR SPONDYLOSIS: ICD-10-CM

## 2020-08-26 DIAGNOSIS — M54.16 LUMBAR RADICULOPATHY: ICD-10-CM

## 2020-08-26 DIAGNOSIS — G89.4 CHRONIC PAIN SYNDROME: Primary | ICD-10-CM

## 2020-08-26 DIAGNOSIS — M54.50 CHRONIC BILATERAL LOW BACK PAIN WITHOUT SCIATICA: ICD-10-CM

## 2020-08-26 DIAGNOSIS — M51.26 BULGE OF LUMBAR DISC WITHOUT MYELOPATHY: ICD-10-CM

## 2020-08-26 DIAGNOSIS — G89.29 CHRONIC BILATERAL LOW BACK PAIN WITHOUT SCIATICA: ICD-10-CM

## 2020-08-26 PROCEDURE — 99214 OFFICE O/P EST MOD 30 MIN: CPT | Performed by: NURSE PRACTITIONER

## 2020-08-26 RX ORDER — PREGABALIN 50 MG/1
CAPSULE ORAL
Qty: 90 CAPSULE | Refills: 1 | Status: SHIPPED | OUTPATIENT
Start: 2020-08-26 | End: 2020-10-21 | Stop reason: SDUPTHER

## 2020-08-26 RX ORDER — PREGABALIN 50 MG/1
CAPSULE ORAL
Qty: 90 CAPSULE | Refills: 1 | Status: SHIPPED | OUTPATIENT
Start: 2020-08-26 | End: 2020-08-26 | Stop reason: SDUPTHER

## 2020-08-26 NOTE — PROGRESS NOTES
Assessment:  1  Chronic pain syndrome    2  Chronic bilateral low back pain without sciatica    3  Lumbar radiculopathy    4  Lumbar spondylosis    5  Bulge of lumbar disc without myelopathy        Plan:  While the patient was in the office today, I did have a thorough conversation with the patient regarding their chronic pain syndrome, symptoms, medication regimen, and treatment plan  With regards to her low back pain and the lumbar radiofrequency ablation procedures, since she is already noting minimal to moderate overall relief, I am also hopeful that over the next 4-6 weeks she will see slow and steady improvement and it does appear that it has been somewhat successful  However, for now, we will take a watch and wait approach and see how she does  The patient was agreeable and verbalized an understanding  With regards to her activity levels, I advised the patient that overall for now she is to slowly and steadily increase her activity, as tolerated, allowing pain be her guide  The patient was agreeable and verbalized an understanding  With regards to her medication regimen, since she does not feel the gabapentin is noting any significant overall relief at this point we decided to titrate off of the gabapentin and try different neuropathic medication such as Lyrica  The patient is to titrate off of the gabapentin and on to the Lyrica as discussed  I advised the patient that if they experience any side effects or issues with the changes in their medication regiment, they should give our office a call to discuss  I also advised the patient not to drive or operate machinery until they see how the changes in the medication regimen affects them  The patient was agreeable and verbalized an understanding  The patient will follow-up in 8 weeks for medication prescription refill and reevaluation  The patient was advised to contact the office should their symptoms worsen in the interim   The patient was agreeable and verbalized an understanding  History of Present Illness: The patient is a 53 y o  female last seen on 8/17/2020 who presents for a follow up office visit in regards to chronic pain syndrome secondary to lumbar spondylosis with a bulging disc with radiculopathy  The patient currently reports that since her last office visit as she is status post her left L3 through L5 radiofrequency ablation procedure on August 17, 2020 and the right L3 through L5 radiofrequency ablation procedure in July, she is noting at least 30% improvement in the back pain and is hopeful that over the next few weeks she will see continued improvement but does report that she is already using less ibuprofen as typically she was taking 800 mg t i d  And now feels she is able to only take it b i d  And is hopeful to even get it down to once a day or less  She continues with the gabapentin at bedtime and at this point is not fully convinced how helpful it is and presents today to discuss her medication regimen and treatment plan  Current pain medications includes:  Gabapentin 900 mg at bedtime  The patient reports that this regimen is providing 30% pain relief  The patient is reporting no side effects from this pain medication regimen  I have personally reviewed and/or updated the patient's past medical history, past surgical history, family history, social history, current medications, allergies, and vital signs today  Review of Systems:    Review of Systems   Respiratory: Negative for shortness of breath  Cardiovascular: Negative for chest pain  Gastrointestinal: Negative for constipation, diarrhea, nausea and vomiting  Musculoskeletal: Positive for gait problem  Negative for arthralgias, joint swelling and myalgias  Skin: Negative for rash  Neurological: Negative for dizziness, seizures and weakness  All other systems reviewed and are negative          Past Medical History:   Diagnosis Date    Asthma     Diabetes [address] [address]     Hypertension        History reviewed  No pertinent surgical history  Family History   Problem Relation Age of Onset    Hypertension Other     Heart disease Other     Cancer Other        Social History     Occupational History    Not on file   Tobacco Use    Smoking status: Never Smoker    Smokeless tobacco: Never Used   Substance and Sexual Activity    Alcohol use: Yes     Frequency: 2-3 times a week    Drug use: Never    Sexual activity: Not on file         Current Outpatient Medications:     doxycycline (DORYX) 100 MG EC tablet, Take 100 mg by mouth 2 (two) times a day, Disp: , Rfl:     escitalopram (LEXAPRO) 20 mg tablet, Take 20 mg by mouth daily, Disp: , Rfl:     ibuprofen (MOTRIN) 800 mg tablet, Take by mouth every 6 (six) hours as needed for mild pain, Disp: , Rfl:     Levothyroxine Sodium 88 MCG CAPS, Take by mouth, Disp: , Rfl:     lisinopril (ZESTRIL) 20 mg tablet, Take 20 mg by mouth daily, Disp: , Rfl:     metFORMIN (GLUCOPHAGE) 500 mg tablet, Take 500 mg by mouth 2 (two) times a day with meals, Disp: , Rfl:     spironolactone (ALDACTONE) 100 mg tablet, Take 100 mg by mouth daily, Disp: , Rfl:     pregabalin (LYRICA) 50 mg capsule, Take 1 PO HS x 6 days, then 2 PO HS x 6 days, then 3 PO HS , Disp: 90 capsule, Rfl: 1    Allergies   Allergen Reactions    Cefazolin        Physical Exam:    /84 (BP Location: Left arm, Patient Position: Sitting, Cuff Size: Standard)   Pulse 98   Temp 98 3 °F (36 8 °C)   Ht 5' 7" (1 702 m)   Wt 115 kg (254 lb)   BMI 39 78 kg/m²     Constitutional:normal, well developed, well nourished, alert, in no distress and non-toxic and no overt pain behavior   and obese  Eyes:anicteric  HEENT:grossly intact  Neck:supple, symmetric, trachea midline and no masses   Pulmonary:even and unlabored  Cardiovascular:No edema or pitting edema present  Skin:Normal without rashes or lesions and well hydrated  Psychiatric:Mood and affect appropriate  Neurologic:Cranial Nerves II-XII grossly intact  Musculoskeletal:The patient's gait is slightly antalgic, but steady without the use of any assistive devices  Imaging  No orders to display         No orders of the defined types were placed in this encounter

## 2020-08-26 NOTE — PATIENT INSTRUCTIONS
Gabapentin 300 mg: Take 1 pill 2 x daily x 6 days, then 1 pill daily x 6 days, Then STOP GABAPENTIN and continue with Lyrica (pregabalin)      Pregabalin (By mouth)   Pregabalin (pre-GA-ba-chalino)  Treats nerve and muscle pain, including fibromyalgia  Also treats seizures  Brand Name(s): Lyrica   There may be other brand names for this medicine  When This Medicine Should Not Be Used: This medicine is not right for everyone  Do not use it if you had an allergic reaction to pregabalin  How to Use This Medicine:   Capsule, Liquid  · Take your medicine as directed  Your dose may need to be changed several times to find what works best for you  · Measure the oral liquid medicine with a marked measuring spoon, oral syringe, or medicine cup  · This medicine should come with a Medication Guide  Ask your pharmacist for a copy if you do not have one  · Missed dose: Take a dose as soon as you remember  If it is almost time for your next dose, wait until then and take a regular dose  Do not take extra medicine to make up for a missed dose  · Store the medicine in a closed container at room temperature, away from heat, moisture, and direct light  Drugs and Foods to Avoid:   Ask your doctor or pharmacist before using any other medicine, including over-the-counter medicines, vitamins, and herbal products  · Some medicines can affect how pregabalin works  Tell your doctor if you are using any of the following:   ¨ Diabetes medicine that you take by mouth (pioglitazone, rosiglitazone)  ¨ An ACE inhibitor (benazepril, enalapril, lisinopril, quinapril, ramipril)  · Tell your doctor if you use anything else that makes you sleepy  Some examples are allergy medicine, narcotic pain medicine, and alcohol  Warnings While Using This Medicine:   · Tell your doctor if you are pregnant or breastfeeding, or if you have kidney disease, heart failure, heart rhythm problems, angioedema, a bleeding disorder, or a low blood platelet count  Tell your doctor if you have a history of alcohol or drug abuse, depression, or other mood problems  · This medicine may cause the following problems:   ¨ Serious allergic reactions  ¨ Suicidal thoughts  · This medicine may make you dizzy or drowsy  It may also cause blurry or double vision  Do not drive or do anything that could be dangerous until you know how this medicine affects you  · Do not stop using this medicine suddenly  Your doctor will need to slowly decrease your dose before you stop it completely  · Your doctor will check your progress and the effects of this medicine at regular visits  Keep all appointments  · Keep all medicine out of the reach of children  Never share your medicine with anyone  Possible Side Effects While Using This Medicine:   Call your doctor right away if you notice any of these side effects:  · Allergic reaction: Itching or hives, swelling in your face or hands, swelling or tingling in your mouth or throat, chest tightness, trouble breathing  · Blistering, peeling, red skin rash  · Blurry or double vision  · Fever, chills, cough, sore throat, and body aches  · Muscle pain, tenderness, or weakness, fever, or general ill feeling  · Rapid weight gain, swelling in your hands, ankles, or feet  · Severe dizziness or drowsiness  · Sudden mood changes, unusual thoughts or behavior such as extreme happiness or depression  · Suicidal thoughts  · Swelling in your throat, head, or neck  · Uneven heartbeat  · Unusual bleeding, bruising, or weakness  If you notice these less serious side effects, talk with your doctor:   · Confusion, trouble concentrating  · Constipation  · Dry mouth  If you notice other side effects that you think are caused by this medicine, tell your doctor  Call your doctor for medical advice about side effects   You may report side effects to FDA at 9-520-FDA-9360  © 2017 [address] [employer] Information is for End User's use only and may not be sold, redistributed or otherwise used for commercial purposes  The above information is an  only  It is not intended as medical advice for individual conditions or treatments  Talk to your doctor, nurse or pharmacist before following any medical regimen to see if it is safe and effective for you

## 2020-10-21 ENCOUNTER — OFFICE VISIT (OUTPATIENT)
Dept: PAIN MEDICINE | Facility: CLINIC | Age: 55
End: 2020-10-21
Payer: COMMERCIAL

## 2020-10-21 VITALS
HEART RATE: 90 BPM | WEIGHT: 251 LBS | DIASTOLIC BLOOD PRESSURE: 88 MMHG | BODY MASS INDEX: 39.39 KG/M2 | TEMPERATURE: 95.5 F | SYSTOLIC BLOOD PRESSURE: 138 MMHG | HEIGHT: 67 IN | RESPIRATION RATE: 20 BRPM

## 2020-10-21 DIAGNOSIS — M47.816 LUMBAR SPONDYLOSIS: ICD-10-CM

## 2020-10-21 DIAGNOSIS — M54.50 CHRONIC BILATERAL LOW BACK PAIN WITHOUT SCIATICA: ICD-10-CM

## 2020-10-21 DIAGNOSIS — G89.29 CHRONIC BILATERAL LOW BACK PAIN WITHOUT SCIATICA: ICD-10-CM

## 2020-10-21 DIAGNOSIS — G89.4 CHRONIC PAIN SYNDROME: ICD-10-CM

## 2020-10-21 DIAGNOSIS — M51.26 BULGE OF LUMBAR DISC WITHOUT MYELOPATHY: ICD-10-CM

## 2020-10-21 DIAGNOSIS — M54.16 LUMBAR RADICULOPATHY: ICD-10-CM

## 2020-10-21 PROCEDURE — 99214 OFFICE O/P EST MOD 30 MIN: CPT | Performed by: NURSE PRACTITIONER

## 2020-10-21 RX ORDER — PREGABALIN 100 MG/1
CAPSULE ORAL
Qty: 60 CAPSULE | Refills: 2 | Status: SHIPPED | OUTPATIENT
Start: 2020-10-21 | End: 2021-01-13 | Stop reason: SDUPTHER

## 2021-01-13 ENCOUNTER — TELEMEDICINE (OUTPATIENT)
Dept: PAIN MEDICINE | Facility: CLINIC | Age: 56
End: 2021-01-13
Payer: COMMERCIAL

## 2021-01-13 DIAGNOSIS — M51.26 BULGE OF LUMBAR DISC WITHOUT MYELOPATHY: ICD-10-CM

## 2021-01-13 DIAGNOSIS — M47.816 LUMBAR SPONDYLOSIS: ICD-10-CM

## 2021-01-13 DIAGNOSIS — G89.4 CHRONIC PAIN SYNDROME: Primary | ICD-10-CM

## 2021-01-13 DIAGNOSIS — G89.29 CHRONIC BILATERAL LOW BACK PAIN WITHOUT SCIATICA: ICD-10-CM

## 2021-01-13 DIAGNOSIS — M54.16 LUMBAR RADICULOPATHY: ICD-10-CM

## 2021-01-13 DIAGNOSIS — M54.50 CHRONIC BILATERAL LOW BACK PAIN WITHOUT SCIATICA: ICD-10-CM

## 2021-01-13 PROCEDURE — G2012 BRIEF CHECK IN BY MD/QHP: HCPCS | Performed by: NURSE PRACTITIONER

## 2021-01-13 RX ORDER — PREGABALIN 150 MG/1
CAPSULE ORAL
Qty: 60 CAPSULE | Refills: 2 | Status: SHIPPED | OUTPATIENT
Start: 2021-01-13 | End: 2021-04-07 | Stop reason: SDUPTHER

## 2021-01-13 NOTE — PROGRESS NOTES
Virtual Brief Visit    Assessment/Plan:  1  I discussed with the patient that at this point time since she is on a subtherapeutic dose of the Lyrica, with some improvement, without side effects, I feel that is in our best interest to further titrate the Lyrica to a more therapeutic dose of 300 mg a day for the next 3 months and see how she does  I advised the patient that if they experience any side effects or issues with the changes in their medication regiment, they should give our office a call to discuss  I also advised the patient not to drive or operate machinery until they see how the changes in the medication regimen affects them  The patient was agreeable and verbalized an understanding  2  While the patient was in the office today, I discussed with the patient that with medication management our overall goal is to reduce the pain symptoms by 50%, 50% of the time, on the least amount medications, with the least amount side effects  The patient was agreeable and verbalized an understanding  3  The patient is to follow-up as scheduled in 12 weeks on April 7, 2021 to arrive at 11:00 a m  For an 11:15 a m  Office visit  The patient was agreeable and verbalized an understanding                 Problem List Items Addressed This Visit        Nervous and Auditory    Lumbar radiculopathy    Relevant Medications    pregabalin (LYRICA) 150 mg capsule       Musculoskeletal and Integument    Lumbar spondylosis    Relevant Medications    pregabalin (LYRICA) 150 mg capsule    Bulge of lumbar disc without myelopathy    Relevant Medications    pregabalin (LYRICA) 150 mg capsule       Other    Chronic bilateral low back pain without sciatica    Relevant Medications    pregabalin (LYRICA) 150 mg capsule    Chronic pain syndrome - Primary    Relevant Medications    pregabalin (LYRICA) 150 mg capsule                Reason for visit is   Chief Complaint   Patient presents with    Virtual Brief Visit        Encounter provider Maribell Myles, [address]     Provider located at [address]  [address]  [facility] [place] [address] [address]  [phone]    Recent Visits  No visits were found meeting these conditions  Showing recent visits within past 7 days and meeting all other requirements     Today's Visits  Date Type Provider Dept   01/13/21 Telemedicine Wesley Portillo, CRNP [facility]   Showing today's visits and meeting all other requirements     Future Appointments  No visits were found meeting these conditions  Showing future appointments within next 150 days and meeting all other requirements      It was my intent to perform this visit via video technology but the patient was not able to do a video connection so the visit was completed via audio telephone only  After connecting through telephone, the patient was identified by name and date of birth  Chelo Colin was informed that this is a telemedicine visit and that the visit is being conducted through telephone  My office door was closed  No one else was in the room  She acknowledged consent and understanding of privacy and security of the platform  The patient has agreed to participate and understands she can discontinue the visit at any time  Patient is aware this is a billable service  Subjective    Chelo Colin is a 63 y o  female who is currently concerned about the risks of COVID-19  The patient currently denies any of the signs or symptoms of COVID-19, but because of age or other comorbidities, medical history, and/or fear of exposure to COVID-19, the patient preferred to proceed with a virtual visit today  The patient is currently being treated for her chronic pain syndrome secondary to lumbar spondylosis with radiculopathy and herniated disc    Patient reports that since her last office visit initially, she felt the increase in the Lyrica was helpful, however, she feels that it is not as helpful as it was would like to discuss if we could possibly increase the Lyrica at this point  The patient is currently taking 200 mg of Lyrica at bedtime and even though it is not as helpful as it was initially, it is still providing better overall relief than the previously prescribed gabapentin  The patient currently denies any side effects from the Lyrica and is reporting minimal to moderate overall relief  Presently she rates her pain as a 5 to 6/10  HPI     Past Medical History:   Diagnosis Date    Asthma     Diabetes ([address] )     Hypertension        No past surgical history on file  Current Outpatient Medications   Medication Sig Dispense Refill    doxycycline (DORYX) 100 MG EC tablet Take 100 mg by mouth 2 (two) times a day      escitalopram (LEXAPRO) 20 mg tablet Take 20 mg by mouth daily      ibuprofen (MOTRIN) 800 mg tablet Take by mouth every 6 (six) hours as needed for mild pain      Levothyroxine Sodium 88 MCG CAPS Take by mouth      lisinopril (ZESTRIL) 20 mg tablet Take 20 mg by mouth daily      metFORMIN (GLUCOPHAGE) 500 mg tablet Take 500 mg by mouth 2 (two) times a day with meals      pregabalin (LYRICA) 150 mg capsule Take 2 PO HS  60 capsule 2    spironolactone (ALDACTONE) 100 mg tablet Take 100 mg by mouth daily       No current facility-administered medications for this visit  Allergies   Allergen Reactions    Cefazolin        Review of Systems   Musculoskeletal: Positive for arthralgias, back pain and gait problem  Neurological: Positive for weakness and numbness  All other systems reviewed and are negative  There were no vitals filed for this visit  I spent 12 minutes discussing the patients past medical/surgical history, current pain symptoms, and medication regiment/treatment plan with the patient today during the virtual visit       Normal OV: 10430    VIRTUAL VISIT DISCLAIMER    Julia Herzog acknowledges that she has consented to an online visit or consultation  She understands that the online visit is based solely on information provided by her, and that, in the absence of a face-to-face physical evaluation by the physician, the diagnosis she receives is both limited and provisional in terms of accuracy and completeness  This is not intended to replace a full medical face-to-face evaluation by the physician  Tang Taylor understands and accepts these terms

## 2021-01-13 NOTE — PATIENT INSTRUCTIONS
Assessment/Plan:  1  I discussed with the patient that at this point time since she is on a subtherapeutic dose of the Lyrica, with some improvement, without side effects, I feel that is in our best interest to further titrate the Lyrica to a more therapeutic dose of 300 mg a day for the next 3 months and see how she does  I advised the patient that if they experience any side effects or issues with the changes in their medication regiment, they should give our office a call to discuss  I also advised the patient not to drive or operate machinery until they see how the changes in the medication regimen affects them  The patient was agreeable and verbalized an understanding  2  While the patient was in the office today, I discussed with the patient that with medication management our overall goal is to reduce the pain symptoms by 50%, 50% of the time, on the least amount medications, with the least amount side effects  The patient was agreeable and verbalized an understanding  3  The patient is to follow-up as scheduled in 12 weeks on April 7, 2021 to arrive at 11:00 a m  For an 11:15 a m  Office visit  The patient was agreeable and verbalized an understanding

## 2021-04-07 ENCOUNTER — OFFICE VISIT (OUTPATIENT)
Dept: PAIN MEDICINE | Facility: CLINIC | Age: 56
End: 2021-04-07
Payer: COMMERCIAL

## 2021-04-07 VITALS
BODY MASS INDEX: 40.18 KG/M2 | DIASTOLIC BLOOD PRESSURE: 80 MMHG | HEIGHT: 67 IN | TEMPERATURE: 97.8 F | WEIGHT: 256 LBS | HEART RATE: 97 BPM | SYSTOLIC BLOOD PRESSURE: 138 MMHG

## 2021-04-07 DIAGNOSIS — G89.29 CHRONIC BILATERAL LOW BACK PAIN WITHOUT SCIATICA: ICD-10-CM

## 2021-04-07 DIAGNOSIS — G89.4 CHRONIC PAIN SYNDROME: Primary | ICD-10-CM

## 2021-04-07 DIAGNOSIS — M51.26 BULGE OF LUMBAR DISC WITHOUT MYELOPATHY: ICD-10-CM

## 2021-04-07 DIAGNOSIS — M54.50 CHRONIC BILATERAL LOW BACK PAIN WITHOUT SCIATICA: ICD-10-CM

## 2021-04-07 DIAGNOSIS — M47.816 LUMBAR SPONDYLOSIS: ICD-10-CM

## 2021-04-07 DIAGNOSIS — M54.16 LUMBAR RADICULOPATHY: ICD-10-CM

## 2021-04-07 PROCEDURE — 99214 OFFICE O/P EST MOD 30 MIN: CPT | Performed by: NURSE PRACTITIONER

## 2021-04-07 RX ORDER — PREGABALIN 200 MG/1
CAPSULE ORAL
Qty: 60 CAPSULE | Refills: 3 | Status: SHIPPED | OUTPATIENT
Start: 2021-04-07 | End: 2021-07-26 | Stop reason: SDUPTHER

## 2021-04-07 NOTE — PROGRESS NOTES
Assessment:  1  Chronic pain syndrome    2  Chronic bilateral low back pain without sciatica    3  Lumbar radiculopathy    4  Lumbar spondylosis    5  Bulge of lumbar disc without myelopathy        Plan:   While the patient was in the office today, I did have a thorough conversation with the patient regarding their chronic pain syndrome, symptoms, medication regimen, and treatment plan  I discussed with the patient that since she is just on the cusp of the therapeutic dosing of Lyrica and initially noticed relief with a plateau effect, without any significant or intolerable side effects, I do agree that would be beneficial to further titrate the Lyrica to 400 mg a day for the next 4 months and see how she does  I advised the patient that if they experience any side effects or issues with the changes in their medication regiment, they should give our office a call to discuss  I also advised the patient not to drive or operate machinery until they see how the changes in the medication regimen affects them  The patient was agreeable and verbalized an understanding  The patient will follow-up in 4 months for medication prescription refill and reevaluation  The patient was advised to contact the office should their symptoms worsen in the interim  The patient was agreeable and verbalized an understanding  History of Present Illness: The patient is a 63 y o  female last seen on 1/13/2021 who presents for a follow up office visit in regards to Chronic pain syndrome secondary to lumbar spondylosis and disc bulging with radiculopathy  The patient currently reports that since her last office visit her pain symptoms have slightly worsened which she feels could be related to the weather and that although initially the Lyrica was much more helpful, she feels she may have plateaued at this point and would like to discuss the possibility of increasing the Lyrica 1 more time to see if that would be more helpful  The patient reports that the only side effect she thinks she may be having is chapped lips or dry mouth, however, she has had that issue before and is not completely convinced it is from the Lyrica  She also has a CPAP and is on many other medications that can also cause these issues  Current pain medications includes:  Lyrica 300 mg daily  The patient reports that this regimen is providing 25% pain relief  The patient is reporting Dry mouth from this pain medication regimen  I have personally reviewed and/or updated the patient's past medical history, past surgical history, family history, social history, current medications, allergies, and vital signs today  Review of Systems:    Review of Systems   Respiratory: Negative for shortness of breath  Cardiovascular: Negative for chest pain  Gastrointestinal: Negative for constipation, diarrhea, nausea and vomiting  Musculoskeletal: Positive for gait problem  Negative for arthralgias, joint swelling and myalgias  Skin: Negative for rash  Neurological: Negative for dizziness, seizures and weakness  All other systems reviewed and are negative  Past Medical History:   Diagnosis Date    Asthma     [address] [address]     Hypertension        History reviewed  No pertinent surgical history      Family History   Problem Relation Age of Onset    Hypertension Other     Heart disease Other     Cancer Other        Social History     Occupational History    Not on file   Tobacco Use    Smoking status: Never Smoker    Smokeless tobacco: Never Used   Substance and Sexual Activity    Alcohol use: Yes     Frequency: 2-3 times a week    Drug use: Never    Sexual activity: Not on file         Current Outpatient Medications:     doxycycline (DORYX) 100 MG EC tablet, Take 100 mg by mouth 2 (two) times a day, Disp: , Rfl:     escitalopram (LEXAPRO) 20 mg tablet, Take 20 mg by mouth daily, Disp: , Rfl:     ibuprofen (MOTRIN) 800 mg tablet, Take by mouth every 6 (six) hours as needed for mild pain, Disp: , Rfl:     Levothyroxine Sodium 88 MCG CAPS, Take by mouth, Disp: , Rfl:     lisinopril (ZESTRIL) 20 mg tablet, Take 20 mg by mouth daily, Disp: , Rfl:     metFORMIN (GLUCOPHAGE) 500 mg tablet, Take 500 mg by mouth 2 (two) times a day with meals, Disp: , Rfl:     pregabalin (LYRICA) 200 MG capsule, Take 2 PO HS , Disp: 60 capsule, Rfl: 3    spironolactone (ALDACTONE) 100 mg tablet, Take 100 mg by mouth daily, Disp: , Rfl:     Allergies   Allergen Reactions    Cefazolin        Physical Exam:    /80 (BP Location: Left arm, Patient Position: Sitting, Cuff Size: Standard)   Pulse 97   Temp 97 8 °F (36 6 °C)   Ht 5' 7" (1 702 m)   Wt 116 kg (256 lb)   BMI 40 10 kg/m²     Constitutional:normal, well developed, well nourished, alert, in no distress and non-toxic and no overt pain behavior  and obese  Eyes:anicteric  HEENT:grossly intact  Neck:supple, symmetric, trachea midline and no masses   Pulmonary:even and unlabored  Cardiovascular:No edema or pitting edema present  Skin:Normal without rashes or lesions and well hydrated  Psychiatric:Mood and affect appropriate  Neurologic:Cranial Nerves II-XII grossly intact  Musculoskeletal:The patient's gait is slightly antalgic, painful, but steady without the use of any assistive devices  Imaging  No orders to display         No orders of the defined types were placed in this encounter

## 2021-04-20 ENCOUNTER — TELEPHONE (OUTPATIENT)
Dept: PAIN MEDICINE | Facility: CLINIC | Age: 56
End: 2021-04-20

## 2021-04-20 NOTE — TELEPHONE ENCOUNTER
Patient called requesting to speak to a nurse regarding her Lyrica dosage increase   Please advise, minnie    Call back# [phone]

## 2021-04-20 NOTE — TELEPHONE ENCOUNTER
S/w pt, stated that she increased lyrica to 200 mg bid as discussed w/ DG w/ significant dizziness and drowsiness  Pt stated that she could not finish her day at work / had to leave early  Pt stated that she stopped lyrica completely, ld thursday 4/15  Per pt, dizziness and drowsiness has resolved  C/o increased pain - taking ibuprofen  No other se's or issues at this time  Pt confirmed previous dose of lyrica, 300 mg / day w/ no se's or issues  Advised pt, neuropathic medications should not be stopped abruptly s/t increased risk of se's / complications  Will d/w DG and cb to advise  Pt verbalized understanding and appreciation

## 2021-07-26 ENCOUNTER — TELEPHONE (OUTPATIENT)
Dept: PAIN MEDICINE | Facility: CLINIC | Age: 56
End: 2021-07-26

## 2021-07-26 ENCOUNTER — TELEMEDICINE (OUTPATIENT)
Dept: PAIN MEDICINE | Facility: CLINIC | Age: 56
End: 2021-07-26
Payer: COMMERCIAL

## 2021-07-26 DIAGNOSIS — M54.50 CHRONIC BILATERAL LOW BACK PAIN WITHOUT SCIATICA: ICD-10-CM

## 2021-07-26 DIAGNOSIS — M51.26 BULGE OF LUMBAR DISC WITHOUT MYELOPATHY: ICD-10-CM

## 2021-07-26 DIAGNOSIS — G89.4 CHRONIC PAIN SYNDROME: Primary | ICD-10-CM

## 2021-07-26 DIAGNOSIS — M54.16 LUMBAR RADICULOPATHY: ICD-10-CM

## 2021-07-26 DIAGNOSIS — M47.816 LUMBAR SPONDYLOSIS: ICD-10-CM

## 2021-07-26 DIAGNOSIS — G89.29 CHRONIC BILATERAL LOW BACK PAIN WITHOUT SCIATICA: ICD-10-CM

## 2021-07-26 PROCEDURE — G2012 BRIEF CHECK IN BY MD/QHP: HCPCS | Performed by: NURSE PRACTITIONER

## 2021-07-26 RX ORDER — PREGABALIN 200 MG/1
CAPSULE ORAL
Qty: 60 CAPSULE | Refills: 3 | Status: SHIPPED | OUTPATIENT
Start: 2021-07-26 | End: 2021-11-15

## 2021-07-26 NOTE — PATIENT INSTRUCTIONS
Assessment/Plan: 1  I discussed with the patient at this point time since the side effects that she had previous called about have improved and she does feel that when she takes the 400 mg a day Lyrica her pain is better, I feel would be beneficial to increase the Lyrica to 400 mg at bedtime every day for the next several months and see how she does  I advised the patient that if they experience any side effects or issues with the changes in their medication regiment, they should give our office a call to discuss  I also advised the patient not to drive or operate machinery until they see how the changes in the medication regimen affects them  The patient was agreeable and verbalized an understanding  2   The patient is to follow-up as scheduled in 16 weeks on November 15, 2021 to arrive at 11:00 a m  for an 11:15 a m  office visit  The patient was agreeable and verbalized an understanding

## 2021-07-26 NOTE — PROGRESS NOTES
Virtual Brief Visit    Verification of patient location:    Patient is located in the following state in which I hold an active license [place]      Assessment/Plan: 1  I discussed with the patient at this point time since the side effects that she had previous called about have improved and she does feel that when she takes the 400 mg a day Lyrica her pain is better, I feel would be beneficial to increase the Lyrica to 400 mg at bedtime every day for the next several months and see how she does  I advised the patient that if they experience any side effects or issues with the changes in their medication regiment, they should give our office a call to discuss  I also advised the patient not to drive or operate machinery until they see how the changes in the medication regimen affects them  The patient was agreeable and verbalized an understanding  2   The patient is to follow-up as scheduled in 16 weeks on November 15, 2021 to arrive at 11:00 a m  for an 11:15 a m  office visit  The patient was agreeable and verbalized an understanding             Problem List Items Addressed This Visit        Nervous and Auditory    Lumbar radiculopathy    Relevant Medications    pregabalin (LYRICA) 200 MG capsule       Musculoskeletal and Integument    Lumbar spondylosis    Relevant Medications    pregabalin (LYRICA) 200 MG capsule    Bulge of lumbar disc without myelopathy    Relevant Medications    pregabalin (LYRICA) 200 MG capsule       Other    Chronic bilateral low back pain without sciatica    Relevant Medications    pregabalin (LYRICA) 200 MG capsule    Chronic pain syndrome - Primary    Relevant Medications    pregabalin (LYRICA) 200 MG capsule                Reason for visit is   Chief Complaint   Patient presents with    Virtual Brief Visit        Encounter provider JENNI Paez    Provider located at [address] [place] [address] E  [facility] [address] [place] [address]  [phone]    Recent Visits  No visits were found meeting these conditions  Showing recent visits within past 7 days and meeting all other requirements  Today's Visits  Date Type Provider Dept   07/26/21 Telephone Sparkle Moll, [address] [place] [address] [facility]   07/26/21 Telemedicine JENNI Sterling  [facility] [place]   Showing today's visits and meeting all other requirements  Future Appointments  No visits were found meeting these conditions  Showing future appointments within next 150 days and meeting all other requirements     It was my intent to perform this visit via video technology but the patient was not able to do a video connection so the visit was completed via audio telephone only  After connecting through telephone, the patient was identified by name and date of birth  Romy Presley was informed that this is a telemedicine visit and that the visit is being conducted through telephone  My office door was closed  No one else was in the room  She acknowledged consent and understanding of privacy and security of the platform  The patient has agreed to participate and understands she can discontinue the visit at any time  Patient is aware this is a billable service  Subjective    Romy Presley is a 63 y o  female who is currently being treated for her chronic pain syndrome secondary to lumbar spondylosis with herniated disc and radiculopathy   The patient reports that she is currently having transportation issues and was not able to make her office visit today so we did proceed with a virtual telephone visit as per her request   The patient reports that since her last office visit she is taking the Lyrica 200 mg 1 pill at bedtime every other day and on the opposite days 2 pills at bedtime and feels that since we switched it from the b i d  dosing to just at bedtime the side effects have significantly improved and she feels that on the day she takes the 2 pills at bedtime her signs or the ability to perform a full hands-on physical exam  Michelle Nesbitt understands she or the provider may request at any time to terminate the video visit and request the patient to seek care or treatment in person

## 2021-07-26 NOTE — TELEPHONE ENCOUNTER
Can you please call the patient and let her know that I changed her OV today to a virtual visit as per her request and that I will contact her at or around her scheduled appointment time  Thank you

## 2021-07-26 NOTE — TELEPHONE ENCOUNTER
Pt called asking if she can do a telemedicine visit for her appt today stating that she is unable to make the drive today      Pt is requesting a call back by 9am    Pt can be reached at [phone]

## 2021-11-15 ENCOUNTER — OFFICE VISIT (OUTPATIENT)
Dept: PAIN MEDICINE | Facility: CLINIC | Age: 56
End: 2021-11-15
Payer: COMMERCIAL

## 2021-11-15 VITALS
SYSTOLIC BLOOD PRESSURE: 128 MMHG | HEIGHT: 67 IN | TEMPERATURE: 97.9 F | WEIGHT: 257 LBS | DIASTOLIC BLOOD PRESSURE: 78 MMHG | BODY MASS INDEX: 40.34 KG/M2 | HEART RATE: 74 BPM

## 2021-11-15 DIAGNOSIS — M47.816 LUMBAR SPONDYLOSIS: ICD-10-CM

## 2021-11-15 DIAGNOSIS — G89.4 CHRONIC PAIN SYNDROME: Primary | ICD-10-CM

## 2021-11-15 DIAGNOSIS — M51.26 BULGE OF LUMBAR DISC WITHOUT MYELOPATHY: ICD-10-CM

## 2021-11-15 DIAGNOSIS — G89.29 CHRONIC BILATERAL LOW BACK PAIN WITHOUT SCIATICA: ICD-10-CM

## 2021-11-15 DIAGNOSIS — M54.16 LUMBAR RADICULOPATHY: ICD-10-CM

## 2021-11-15 DIAGNOSIS — M54.50 CHRONIC BILATERAL LOW BACK PAIN WITHOUT SCIATICA: ICD-10-CM

## 2021-11-15 PROCEDURE — 99214 OFFICE O/P EST MOD 30 MIN: CPT | Performed by: NURSE PRACTITIONER

## 2021-11-15 RX ORDER — CELECOXIB 200 MG/1
CAPSULE ORAL
Qty: 60 CAPSULE | Refills: 2 | Status: SHIPPED | OUTPATIENT
Start: 2021-11-15 | End: 2022-03-28 | Stop reason: SDUPTHER

## 2021-11-15 RX ORDER — MULTIVITAMIN
1 TABLET ORAL DAILY
COMMUNITY

## 2021-11-15 RX ORDER — PREGABALIN 200 MG/1
CAPSULE ORAL
Qty: 60 CAPSULE | Refills: 3 | Status: CANCELLED | OUTPATIENT
Start: 2021-11-15

## 2022-02-07 ENCOUNTER — TELEPHONE (OUTPATIENT)
Dept: PAIN MEDICINE | Facility: CLINIC | Age: 57
End: 2022-02-07

## 2022-03-28 ENCOUNTER — OFFICE VISIT (OUTPATIENT)
Dept: PAIN MEDICINE | Facility: CLINIC | Age: 57
End: 2022-03-28
Payer: COMMERCIAL

## 2022-03-28 VITALS
BODY MASS INDEX: 37.04 KG/M2 | HEIGHT: 67 IN | WEIGHT: 236 LBS | SYSTOLIC BLOOD PRESSURE: 126 MMHG | TEMPERATURE: 97.4 F | DIASTOLIC BLOOD PRESSURE: 74 MMHG | HEART RATE: 74 BPM

## 2022-03-28 DIAGNOSIS — M54.2 NECK PAIN: ICD-10-CM

## 2022-03-28 DIAGNOSIS — M54.16 LUMBAR RADICULOPATHY: ICD-10-CM

## 2022-03-28 DIAGNOSIS — M54.50 CHRONIC BILATERAL LOW BACK PAIN WITHOUT SCIATICA: ICD-10-CM

## 2022-03-28 DIAGNOSIS — M25.512 CHRONIC LEFT SHOULDER PAIN: ICD-10-CM

## 2022-03-28 DIAGNOSIS — G89.4 CHRONIC PAIN SYNDROME: Primary | ICD-10-CM

## 2022-03-28 DIAGNOSIS — M51.26 BULGE OF LUMBAR DISC WITHOUT MYELOPATHY: ICD-10-CM

## 2022-03-28 DIAGNOSIS — G89.29 CHRONIC LEFT SHOULDER PAIN: ICD-10-CM

## 2022-03-28 DIAGNOSIS — G89.29 CHRONIC BILATERAL LOW BACK PAIN WITHOUT SCIATICA: ICD-10-CM

## 2022-03-28 DIAGNOSIS — M47.816 LUMBAR SPONDYLOSIS: ICD-10-CM

## 2022-03-28 PROCEDURE — 99214 OFFICE O/P EST MOD 30 MIN: CPT | Performed by: NURSE PRACTITIONER

## 2022-03-28 RX ORDER — CELECOXIB 200 MG/1
CAPSULE ORAL
Qty: 60 CAPSULE | Refills: 2 | Status: CANCELLED | OUTPATIENT
Start: 2022-03-28

## 2022-03-28 RX ORDER — CELECOXIB 200 MG/1
CAPSULE ORAL
Qty: 60 CAPSULE | Refills: 2 | Status: SHIPPED | OUTPATIENT
Start: 2022-03-28 | End: 2022-06-27 | Stop reason: SDUPTHER

## 2022-03-28 NOTE — PROGRESS NOTES
Assessment:  1  Chronic pain syndrome    2  Chronic bilateral low back pain without sciatica    3  Neck pain    4  Chronic left shoulder pain    5  Lumbar radiculopathy    6  Lumbar spondylosis    7  Bulge of lumbar disc without myelopathy        Plan:  While the patient was in the office today, I did have a thorough conversation with the patient regarding their chronic pain syndrome, symptoms, medication regimen, and treatment plan  I discussed with the patient that with regards to her left shoulder, I feel would be beneficial to proceed with an x-ray of the left shoulder to evaluate for any underlying etiology could explain her pain and then depending on the x-ray results we may consider physical therapy 1st and then if she fails therapy try a left shoulder injection with Dr Margarita Ortega  However, we will wait the results of the left shoulder x-rays and proceed from there as appropriate  The patient was agreeable and verbalized an understanding  With regards to her Celebrex, I explained to the patient that the Celebrex and ibuprofen or similar and technically the Celebrex stronger so I feel would be in her best interest to finish out her ibuprofen and then fill her Celebrex script as the reason her insurance was the nonhealing the Celebrex was because she kept filling the ibuprofen  I advised the patient that she is not to take the Celebrex and ibuprofen together and ask her pharmacy did put the ibuprofen scripts on hold and let her try the Celebrex  I advised the patient that if they experience any side effects or issues with the changes in their medication regiment, they should give our office a call to discuss  I also advised the patient not to drive or operate machinery until they see how the changes in the medication regimen affects them  The patient was agreeable and verbalized an understanding  The patient will follow-up in 13 weeks for medication prescription refill and reevaluation   The patient was advised to contact the office should their symptoms worsen in the interim  The patient was agreeable and verbalized an understanding  History of Present Illness: The patient is a 61 y o  female last seen on 11/15/2021 who presents for a follow up office visit in regards to chronic pain syndrome, as the patient's pain has been ongoing for greater than a year, secondary to lumbar spondylosis with a bulging discs radiculopathy as well as worsening left shoulder and neck pain  The patient currently reports that since her last office visit she was not able to start Celebrex as she did not understand that she could take Celebrex and ibuprofen was afraid to stop the ibuprofen because that seems to help her overall swelling issues  The patient reports that her left shoulder pain and neck pain on the left side has started to return, without any significant trauma or injury and would like to discuss any options for evaluation or treatment for her shoulder pain as well  I have personally reviewed and/or updated the patient's past medical history, past surgical history, family history, social history, current medications, allergies, and vital signs today  Review of Systems:    Review of Systems   Respiratory: Positive for shortness of breath  Cardiovascular: Negative for chest pain  Gastrointestinal: Negative for constipation, diarrhea, nausea and vomiting  Musculoskeletal: Positive for gait problem  Negative for arthralgias, joint swelling and myalgias  Skin: Negative for rash  Neurological: Negative for dizziness, seizures and weakness  All other systems reviewed and are negative  Past Medical History:   Diagnosis Date    Asthma     Diabetes ([facility] [address] )     Hypertension        History reviewed  No pertinent surgical history      Family History   Problem Relation Age of Onset    Hypertension Other     Heart disease Other     Cancer Other        Social History     Occupational History    Not on file   Tobacco Use    Smoking status: Never Smoker    Smokeless tobacco: Never Used   Substance and Sexual Activity    Alcohol use: Yes    Drug use: Never    Sexual activity: Not on file         Current Outpatient Medications:     doxycycline (DORYX) 100 MG EC tablet, Take 100 mg by mouth 2 (two) times a day, Disp: , Rfl:     escitalopram (LEXAPRO) 20 mg tablet, Take 20 mg by mouth daily, Disp: , Rfl:     Ferrous Sulfate (IRON PO), Take by mouth, Disp: , Rfl:     ibuprofen (MOTRIN) 800 mg tablet, Take by mouth every 6 (six) hours as needed for mild pain, Disp: , Rfl:     Levothyroxine Sodium 88 MCG CAPS, Take by mouth, Disp: , Rfl:     lisinopril (ZESTRIL) 20 mg tablet, Take 20 mg by mouth daily, Disp: , Rfl:     metFORMIN (GLUCOPHAGE) 500 mg tablet, Take 500 mg by mouth 2 (two) times a day with meals, Disp: , Rfl:     Multiple Vitamin (multivitamin) tablet, Take 1 tablet by mouth daily, Disp: , Rfl:     spironolactone (ALDACTONE) 100 mg tablet, Take 100 mg by mouth daily, Disp: , Rfl:     celecoxib (CeleBREX) 200 mg capsule, Take 1 PO BID PRN for pain  DO NOT take any other NSAIDs while on this medication  , Disp: 60 capsule, Rfl: 2    Allergies   Allergen Reactions    Cefazolin        Physical Exam:    /74 (BP Location: Left arm, Patient Position: Sitting, Cuff Size: Standard)   Pulse 74   Temp (!) 97 4 °F (36 3 °C)   Ht 5' 7" (1 702 m)   Wt 107 kg (236 lb)   BMI 36 96 kg/m²     Constitutional:normal, well developed, well nourished, alert, in no distress and non-toxic and no overt pain behavior   and overweight  Eyes:anicteric  HEENT:grossly intact  Neck:supple, symmetric, trachea midline and no masses   Pulmonary:even and unlabored  Cardiovascular:No edema or pitting edema present  Skin:Normal without rashes or lesions and well hydrated  Psychiatric:Mood and affect appropriate  Neurologic:Cranial Nerves II-XII grossly intact  Musculoskeletal:The patient's gait is slightly antalgic, but steady without the use of any assistive devices  Tenderness noted upon exam the left AC joint        Imaging  XR shoulder 2+ vw left    (Results Pending)         Orders Placed This Encounter   Procedures    XR shoulder 2+ vw left

## 2022-05-23 ENCOUNTER — HOSPITAL ENCOUNTER (OUTPATIENT)
Dept: RADIOLOGY | Facility: CLINIC | Age: 57
Discharge: HOME | End: 2022-05-23
Attending: ANESTHESIOLOGY | Admitting: ANESTHESIOLOGY
Payer: COMMERCIAL

## 2022-05-23 VITALS
RESPIRATION RATE: 18 BRPM | DIASTOLIC BLOOD PRESSURE: 70 MMHG | SYSTOLIC BLOOD PRESSURE: 104 MMHG | HEART RATE: 86 BPM | OXYGEN SATURATION: 97 % | TEMPERATURE: 97.7 F

## 2022-05-23 DIAGNOSIS — M19.012 LOCALIZED OSTEOARTHRITIS OF LEFT SHOULDER: ICD-10-CM

## 2022-05-23 DIAGNOSIS — G89.29 CHRONIC LEFT SHOULDER PAIN: ICD-10-CM

## 2022-05-23 DIAGNOSIS — M25.512 CHRONIC LEFT SHOULDER PAIN: ICD-10-CM

## 2022-05-23 PROCEDURE — 20610 DRAIN/INJ JOINT/BURSA W/O US: CPT | Performed by: ANESTHESIOLOGY

## 2022-05-23 PROCEDURE — 77002 NEEDLE LOCALIZATION BY XRAY: CPT

## 2022-05-23 PROCEDURE — 77002 NEEDLE LOCALIZATION BY XRAY: CPT | Performed by: ANESTHESIOLOGY

## 2022-05-23 PROCEDURE — A9579 GAD-BASE MR CONTRAST NOS,1ML: HCPCS | Performed by: ANESTHESIOLOGY

## 2022-05-23 RX ORDER — METHYLPREDNISOLONE ACETATE 80 MG/ML
80 INJECTION, SUSPENSION INTRA-ARTICULAR; INTRALESIONAL; INTRAMUSCULAR; PARENTERAL; SOFT TISSUE ONCE
Status: COMPLETED | OUTPATIENT
Start: 2022-05-23 | End: 2022-05-23

## 2022-05-23 RX ADMIN — LIDOCAINE HYDROCHLORIDE 2 ML: 20 INJECTION, SOLUTION EPIDURAL; INFILTRATION; INTRACAUDAL at 10:36

## 2022-05-23 RX ADMIN — GADOPENTETATE DIMEGLUMINE 0.5 ML: 469.01 INJECTION INTRAVENOUS at 10:36

## 2022-05-23 RX ADMIN — METHYLPREDNISOLONE ACETATE 80 MG: 80 INJECTION, SUSPENSION INTRA-ARTICULAR; INTRALESIONAL; INTRAMUSCULAR; SOFT TISSUE at 10:36

## 2022-05-23 NOTE — H&P
History of Present Illness: The patient is a 61 y o  female who presents with complaints of decreased range of motion left shoulder and left shoulder pain  Patient Active Problem List   Diagnosis    Chronic bilateral low back pain without sciatica    Chronic pain syndrome    Lumbar spondylosis    Bulge of lumbar disc without myelopathy    Lumbar radiculopathy    Neck pain    Chronic left shoulder pain    Localized osteoarthritis of left shoulder       Past Medical History:   Diagnosis Date    Asthma     Diabetes ([address] )     Hypertension        No past surgical history on file  Current Outpatient Medications:     celecoxib (CeleBREX) 200 mg capsule, Take 1 PO BID PRN for pain  DO NOT take any other NSAIDs while on this medication  , Disp: 60 capsule, Rfl: 2    doxycycline (DORYX) 100 MG EC tablet, Take 100 mg by mouth 2 (two) times a day, Disp: , Rfl:     escitalopram (LEXAPRO) 20 mg tablet, Take 20 mg by mouth daily, Disp: , Rfl:     Ferrous Sulfate (IRON PO), Take by mouth, Disp: , Rfl:     ibuprofen (MOTRIN) 800 mg tablet, Take by mouth every 6 (six) hours as needed for mild pain, Disp: , Rfl:     Levothyroxine Sodium 88 MCG CAPS, Take by mouth, Disp: , Rfl:     lisinopril (ZESTRIL) 20 mg tablet, Take 20 mg by mouth daily, Disp: , Rfl:     metFORMIN (GLUCOPHAGE) 500 mg tablet, Take 500 mg by mouth 2 (two) times a day with meals, Disp: , Rfl:     Multiple Vitamin (multivitamin) tablet, Take 1 tablet by mouth daily, Disp: , Rfl:     spironolactone (ALDACTONE) 100 mg tablet, Take 100 mg by mouth daily, Disp: , Rfl:     Current Facility-Administered Medications:     gadopentetate dimeglumine (MAGNEVIST) injection 5 mL, 5 mL, Intra-articular, Once in imaging, Darío Walls DO    lidocaine (PF) (XYLOCAINE-MPF) 2 % injection 5 mL, 5 mL, Intra-articular, Once, Darío Walls DO    methylPREDNISolone acetate (DEPO-MEDROL) injection 80 mg, 80 mg, Intra-articular, Once, Donta Summers DO    Allergies Allergen Reactions    Cefazolin        Physical Exam:   General: Awake, Alert, Oriented x 3, Mood and affect appropriate  Respiratory: Respirations even and unlabored  Cardiovascular: Peripheral pulses intact; no edema  Musculoskeletal Exam:  Decreased range of motion left shoulder    ASA Score: III         Assessment:   1  Chronic left shoulder pain    2   Localized osteoarthritis of left shoulder        Plan: Left Shoulder Injection

## 2022-05-23 NOTE — DISCHARGE INSTRUCTIONS
Steroid Joint Injection   WHAT YOU NEED TO KNOW:   A steroid joint injection is a procedure to inject steroid medicine into a joint  Steroid medicine decreases pain and inflammation  The injection may also contain an anesthetic (numbing medicine) to decrease pain  It may be done to treat conditions such as arthritis, gout, or carpal tunnel syndrome  The injections may be given in your knee, ankle, shoulder, elbow, wrist, ankle or sacroiliac joint  Do not apply heat to any area that is numb  If you have discomfort or soreness at the injection site, you may apply ice today, 20 minutes on and 20 minutes off  Tomorrow you may use ice or warm, moist heat  Do not apply ice or heat directly to the skin  You may have an increase or change in the discomfort for 36-48 hours after your treatment  Apply ice and continue with any pain medicine you have been prescribed  Do not do anything strenuous today  You may shower, but no tub baths or hot tubs today  You may resume your normal activities tomorrow, but do not overdo it  Resume normal activities slowly when you are feeling better  If you experience redness, drainage or swelling at the injection site, or if you develop a fever above 100 degrees, please call [facility] at [phone] or go to the Emergency Room  Continue to take all routine medicines prescribed by your primary care physician unless otherwise instructed by our staff  Most blood thinners should be started again according to your regularly scheduled dosing  If you have any questions, please give our office a call  As no general anesthesia was used in today's procedure, you should not experience any side effects related to anesthesia  If you have a problem specifically related to your procedure, please call our office at [phone]  Problems not related to your procedure should be directed to your primary care physician

## 2022-05-31 ENCOUNTER — TELEPHONE (OUTPATIENT)
Dept: PAIN MEDICINE | Facility: CLINIC | Age: 57
End: 2022-05-31

## 2022-05-31 NOTE — TELEPHONE ENCOUNTER
1st attempt  Lm to cb with % improvement and pain level.     Left Shoulder Injection 5/23, F/u 6/27

## 2022-06-27 ENCOUNTER — OFFICE VISIT (OUTPATIENT)
Dept: PAIN MEDICINE | Facility: CLINIC | Age: 57
End: 2022-06-27
Payer: COMMERCIAL

## 2022-06-27 VITALS
SYSTOLIC BLOOD PRESSURE: 122 MMHG | WEIGHT: 229 LBS | HEIGHT: 67 IN | HEART RATE: 78 BPM | TEMPERATURE: 97.6 F | BODY MASS INDEX: 35.94 KG/M2 | DIASTOLIC BLOOD PRESSURE: 78 MMHG

## 2022-06-27 DIAGNOSIS — M79.18 MYOFASCIAL PAIN SYNDROME: ICD-10-CM

## 2022-06-27 DIAGNOSIS — M47.816 LUMBAR SPONDYLOSIS: ICD-10-CM

## 2022-06-27 DIAGNOSIS — M51.26 BULGE OF LUMBAR DISC WITHOUT MYELOPATHY: ICD-10-CM

## 2022-06-27 DIAGNOSIS — M54.16 LUMBAR RADICULOPATHY: ICD-10-CM

## 2022-06-27 DIAGNOSIS — M25.512 CHRONIC LEFT SHOULDER PAIN: ICD-10-CM

## 2022-06-27 DIAGNOSIS — M54.2 NECK PAIN: ICD-10-CM

## 2022-06-27 DIAGNOSIS — G89.4 CHRONIC PAIN SYNDROME: Primary | ICD-10-CM

## 2022-06-27 DIAGNOSIS — M54.50 CHRONIC BILATERAL LOW BACK PAIN WITHOUT SCIATICA: ICD-10-CM

## 2022-06-27 DIAGNOSIS — G89.29 CHRONIC BILATERAL LOW BACK PAIN WITHOUT SCIATICA: ICD-10-CM

## 2022-06-27 DIAGNOSIS — M19.012 LOCALIZED OSTEOARTHRITIS OF LEFT SHOULDER: ICD-10-CM

## 2022-06-27 DIAGNOSIS — G89.29 CHRONIC LEFT SHOULDER PAIN: ICD-10-CM

## 2022-06-27 PROCEDURE — 99214 OFFICE O/P EST MOD 30 MIN: CPT | Performed by: NURSE PRACTITIONER

## 2022-06-27 RX ORDER — TIZANIDINE 2 MG/1
TABLET ORAL
Qty: 45 TABLET | Refills: 3 | Status: SHIPPED | OUTPATIENT
Start: 2022-06-27

## 2022-06-27 RX ORDER — CELECOXIB 200 MG/1
CAPSULE ORAL
Qty: 60 CAPSULE | Refills: 3 | Status: SHIPPED | OUTPATIENT
Start: 2022-06-27

## 2022-06-27 NOTE — PATIENT INSTRUCTIONS
Exercises for Internal and External Shoulder Rotation   AMBULATORY CARE:   Muscles worked during internal and external shoulder exercises:  Exercises for internal shoulder rotation work the muscles in your chest and front of your shoulder  Exercises for external shoulder rotation work the muscles in the back of your shoulder and upper back  Contact your healthcare provider if:   You have sharp or worsening pain during exercise or at rest     You have questions or concerns about your shoulder exercises  Before you exercise:  Warm up and stretch before you exercise  Walk or ride a stationary bike for 5 to 10 minutes to help you warm up  Stretching helps increase range of motion  It may also decrease muscle soreness and help prevent another injury  Your healthcare provider will tell you which of the following stretches to do:  Crossover arm stretch:  Relax your shoulders  Hold your upper arm with the opposite hand  Pull your arm across your chest until you feel a stretch  Hold the stretch for 30 seconds  Return to the starting position  Shoulder flexion stretch:  Stand facing a wall  Slowly walk your fingers up the wall until you feel a stretch  Hold the stretch for 30 seconds  Return to the starting position  Sleeper stretch:  Lie on your injured side on a firm, flat surface  Bend the elbow of your injured arm 90° with your hand facing up  Use your arm that is not injured to slowly push your injured arm down  Stop when you feel a stretch at the back of your injured shoulder  Hold the stretch for 30 seconds  Slowly return to the starting position  How to exercise with a weight:  Your healthcare provider will tell you how much weight to exercise with  Shoulder internal rotation:  Sit in a chair  Place a rolled up towel between your elbow and your side  Bend your elbow to 90°  Gently squeeze the towel with your elbow to prevent it from falling out  Hold the weight with your thumb pointing up  Slowly move the weight across your chest  Stop when your hand reaches your opposite arm  Hold this position for as many seconds as directed  Slowly return to the starting position  Shoulder external rotation:  Lie on your side with your injured shoulder facing up  Bend your elbow 90°  Place a rolled up towel between your elbow and your side  Hold a weight in your hand  Gently squeeze the towel with your elbow to prevent it from falling out  Slowly rotate your arm outward, but keep your elbow bent  Stop when you feel a stretch  Hold this position for 30 seconds or as directed  Slowly return to the starting position  How to exercise with an exercise band:   Shoulder internal rotation:  Tie one end of the exercise band to a heavy, secure object  Sit in a chair  Place a rolled up towel between your elbow and your side  Bend your elbow to 90°  Gently squeeze the towel with your elbow to prevent it from falling out  Slowly pull the band across your chest  Stop when your hand reaches your opposite arm  Hold this position for as many seconds as directed  Slowly return to the starting position  Shoulder external rotation:  Hold one end of the exercise band on the side that is not injured  Place a rolled up towel between your elbow and your side  Bend your elbow 90°  Squeeze the towel with your elbow  Grab the end of the band and slowly turn your arm outward, but keep your elbow bent  Stop when you feel a stretch  Hold this position for 30 seconds or as directed  Slowly return to the starting position  Follow up with your physical therapist as directed:  Write down your questions so you remember to ask them at your visits  © Copyright [employer] 2022 Information is for End User's use only and may not be sold, redistributed or otherwise used for commercial purposes   All illustrations and images included in CareNotes® are the copyrighted property of [employer]  or Brett Saeed  The above information is an  only  It is not intended as medical advice for individual conditions or treatments  Talk to your doctor, nurse or pharmacist before following any medical regimen to see if it is safe and effective for you  Tizanidine (By mouth)   Tizanidine ([id number])  Treats muscle spasticity  Brand Name(s): Zanaflex, Zanaflex Capsule   There may be other brand names for this medicine  When This Medicine Should Not Be Used: This medicine is not right for everyone  Do not use if you had an allergic reaction to tizanidine  How to Use This Medicine:   Capsule, Tablet  Take your medicine as directed  Your dose may need to be changed several times to find what works best for you  You may take this medicine with or without food, but always take it the same way every time  Tizanidine works differently depending on whether you take it on an empty stomach or a full stomach  Talk to your doctor if you have any questions about this  Missed dose: Take a dose as soon as you remember  If it is almost time for your next dose, wait until then and take a regular dose  Do not take extra medicine to make up for a missed dose  Store the medicine in a closed container at room temperature, away from heat, moisture, and direct light  Drugs and Foods to Avoid:   Ask your doctor or pharmacist before using any other medicine, including over-the-counter medicines, vitamins, and herbal products  Do not use this medicine together with ciprofloxacin or fluvoxamine  Some foods and medicines can affect how tizanidine works  Tell your doctor if you are using any of the following:  Acyclovir, baclofen, cimetidine, famotidine, ticlopidine, verapamil, zileuton  Birth control pills, blood pressure medicine, medicine for heart rhythm problems (such as amiodarone, mexiletine, propafenone), or medicine to treat an infection (such as levofloxacin, moxifloxacin)  Do not drink alcohol while you are using this medicine    Tell your doctor if you use anything else that makes you sleepy  Some examples are allergy medicine, narcotic pain medicine, and alcohol  Warnings While Using This Medicine:   Tell your doctor if you are pregnant or breastfeeding, or if you have kidney disease or liver disease  This medicine may cause the following problems:  Low blood pressure  Liver damage  This medicine may make you dizzy or drowsy  Do not drive or do anything else that could be dangerous until you know how this medicine affects you  Stand or sit up slowly if you are dizzy  Do not stop using this medicine suddenly  Your doctor will need to slowly decrease your dose before you stop it completely  Your doctor will do lab tests at regular visits to check on the effects of this medicine  Keep all appointments  Keep all medicine out of the reach of children  Never share your medicine with anyone  Possible Side Effects While Using This Medicine:   Call your doctor right away if you notice any of these side effects: Allergic reaction: Itching or hives, swelling in your face or hands, swelling or tingling in your mouth or throat, chest tightness, trouble breathing  Dark urine or pale stools, nausea, vomiting, loss of appetite, stomach pain, yellow skin or eyes  Lightheadedness, dizziness, or fainting  Seeing or hearing things that are not really there  Slow heartbeat  If you notice these less serious side effects, talk with your doctor:   Dry mouth  Drowsiness or sleepiness  Weakness  If you notice other side effects that you think are caused by this medicine, tell your doctor  Call your doctor for medical advice about side effects  You may report side effects to FDA at 1-083-FDA-1088    © Copyright [address] 2022 Information is for End User's use only and may not be sold, redistributed or otherwise used for commercial purposes  The above information is an  only   It is not intended as medical advice for individual conditions or treatments  Talk to your doctor, nurse or pharmacist before following any medical regimen to see if it is safe and effective for you

## 2022-06-27 NOTE — PROGRESS NOTES
Assessment:  1  Chronic pain syndrome    2  Chronic left shoulder pain    3  Neck pain    4  Chronic bilateral low back pain without sciatica    5  Lumbar radiculopathy    6  Myofascial pain syndrome    7  Lumbar spondylosis    8  Bulge of lumbar disc without myelopathy    9  Localized osteoarthritis of left shoulder        Plan:  While the patient was in the office today, I did have a thorough conversation with the patient regarding their chronic pain syndrome, symptoms, medication regimen, and treatment plan  I discussed with the patient that this point in time with regards to her left shoulder, I do feel that if she can at least get herself into a home exercise and stretching routine, utilizing 20 minutes of low heat before she stretches and then 20 minutes of ice later on in the day or at nighttime I feel that this would probably help her overall symptoms and keep her pain at least at moderate improvement  I did give her some shoulder stretches and rotator cuff stretches in her after visit summary today to try to help as per her request   For now, we will hold off any repeat injections or procedures  The patient was agreeable and verbalized an understanding  I discussed the patient's overall feel that there is nothing significant myofascial pain and feel that she may benefit from a muscle relaxer such as tizanidine 2 mg and she can take 1-2 pills at nighttime depending on her pain  I did explain to the patient that I would like her to start at least taking 1 pill every night and then if she feels it is not helpful but is not having side effects she can start taking 2 pills and try to take it consistently so at least I would maybe carrier through to the morning and she could take Celebrex later on in the day or at lunchtime and then maybe she will not always need to Celebrex a day and only take 2 Celebrex a day on the days the pain is more significant    I advised the patient that if they experience any side effects or issues with the changes in their medication regiment, they should give our office a call to discuss  I also advised the patient not to drive or operate machinery until they see how the changes in the medication regimen affects them  The patient was agreeable and verbalized an understanding  The patient will follow-up in 4 months for medication prescription refill and reevaluation  The patient was advised to contact the office should their symptoms worsen in the interim  The patient was agreeable and verbalized an understanding  History of Present Illness: The patient is a 61 y o  female last seen on 5/23/2022 who presents for a follow up office visit in regards to chronic pain syndrome, as the patient's pain has been ongoing for greater than a year, secondary to localized osteoarthritis of the left shoulder with lumbar spondylosis and radiculopathy and lumbar bulging disc with chronic neck and myofascial   The patient currently reports that since her last office visit her pain symptoms have slightly improved overall as she did proceed with updated x-rays of the left shoulder which showed mild osteoarthritis without any evidence of fractures as well as a left intra-articular shoulder joint injection on May 23, 2022 with Dr Lenin Gonzalez  The patient reports that for the 1st week or 2 there was definite and significant overall improvement in the left shoulder pain but she was also having some right-sided neck pain that she feels distracted her from the shoulder pain and once she was able to calm down the right-sided neck pain the shoulder pain seems to start to bother her again    The patient reports that she still feels that overall the left shoulder pain is slightly better than it was prior to the injection and that at this point she is trying to do some range-of-motion exercises and stretching on her own as she is not interested in proceeding with physical therapy as she reports she knows that she will not go consistently and wanted to know if we could give her some stretches to do at home and whether or not she should be using ice or heat  The patient also reports she does feel that the Celebrex is helpful especially being able to take it twice a day and has not been taking ibuprofen as we instructed but would like to try to see if she can figure out a way to take the Celebrex only once a day as she knows that it isn't good for her long-term but does feel that as the day goes on into the evening when she gets home she would need something to try to help her at nighttime especially with some of the myofascial component  The patient presents today for regular medication follow-up visit and to discuss her treatment plan options  Current pain medications includes:  Celebrex 200 mg b i d  The patient reports that this regimen is providing 30% pain relief  The patient is reporting no side effects from this pain medication regimen  I have personally reviewed and/or updated the patient's past medical history, past surgical history, family history, social history, current medications, allergies, and vital signs today  Review of Systems:    Review of Systems   Respiratory: Negative for shortness of breath  Cardiovascular: Negative for chest pain  Gastrointestinal: Negative for constipation, diarrhea, nausea and vomiting  Musculoskeletal: Positive for gait problem  Negative for arthralgias, joint swelling and myalgias  Skin: Negative for rash  Neurological: Negative for dizziness, seizures and weakness  All other systems reviewed and are negative  Past Medical History:   Diagnosis Date    Asthma     Diabetes ([facility] [address] )     Hypertension        History reviewed  No pertinent surgical history      Family History   Problem Relation Age of Onset    Hypertension Other     Heart disease Other     Cancer Other        Social History     Occupational History    Not on file   Tobacco Use    Smoking status: Never Smoker    Smokeless tobacco: Never Used   Substance and Sexual Activity    Alcohol use: Yes    Drug use: Never    Sexual activity: Not on file         Current Outpatient Medications:     celecoxib (CeleBREX) 200 mg capsule, Take 1 PO BID PRN for pain  DO NOT take any other NSAIDs while on this medication  , Disp: 60 capsule, Rfl: 3    doxycycline (DORYX) 100 MG EC tablet, Take 100 mg by mouth 2 (two) times a day, Disp: , Rfl:     escitalopram (LEXAPRO) 20 mg tablet, Take 20 mg by mouth daily, Disp: , Rfl:     Ferrous Sulfate (IRON PO), Take by mouth, Disp: , Rfl:     Levothyroxine Sodium 88 MCG CAPS, Take by mouth, Disp: , Rfl:     lisinopril (ZESTRIL) 20 mg tablet, Take 20 mg by mouth daily, Disp: , Rfl:     metFORMIN (GLUCOPHAGE) 500 mg tablet, Take 500 mg by mouth 2 (two) times a day with meals, Disp: , Rfl:     Multiple Vitamin (multivitamin) tablet, Take 1 tablet by mouth daily, Disp: , Rfl:     spironolactone (ALDACTONE) 100 mg tablet, Take 100 mg by mouth daily, Disp: , Rfl:     tiZANidine (ZANAFLEX) 2 mg tablet, Take 1-2 PO HS PRN for pain and spasms  , Disp: 45 tablet, Rfl: 3    ibuprofen (MOTRIN) 800 mg tablet, Take by mouth every 6 (six) hours as needed for mild pain (Patient not taking: Reported on 6/27/2022), Disp: , Rfl:     Allergies   Allergen Reactions    Cefazolin        Physical Exam:    /78 (BP Location: Left arm, Patient Position: Sitting, Cuff Size: Standard)   Pulse 78   Temp 97 6 °F (36 4 °C)   Ht 5' 7" (1 702 m)   Wt 104 kg (229 lb)   BMI 35 87 kg/m²     Constitutional:normal, well developed, well nourished, alert, in no distress and non-toxic and no overt pain behavior   and overweight  Eyes:anicteric  HEENT:grossly intact  Neck:supple, symmetric, trachea midline and no masses   Pulmonary:even and unlabored  Cardiovascular:No edema or pitting edema present  Skin:Normal without rashes or lesions and well hydrated  Psychiatric:Mood and affect appropriate  Neurologic:Cranial Nerves II-XII grossly intact  Musculoskeletal:The patient's gait is slightly antalgic, but steady without the use of any assistive devices  Imaging  No orders to display         No orders of the defined types were placed in this encounter

## 2022-10-17 ENCOUNTER — OFFICE VISIT (OUTPATIENT)
Dept: PAIN MEDICINE | Facility: CLINIC | Age: 57
End: 2022-10-17
Payer: COMMERCIAL

## 2022-10-17 VITALS
SYSTOLIC BLOOD PRESSURE: 132 MMHG | HEART RATE: 76 BPM | HEIGHT: 67 IN | BODY MASS INDEX: 39.55 KG/M2 | TEMPERATURE: 98.4 F | WEIGHT: 252 LBS | DIASTOLIC BLOOD PRESSURE: 78 MMHG

## 2022-10-17 DIAGNOSIS — M54.2 NECK PAIN: ICD-10-CM

## 2022-10-17 DIAGNOSIS — M79.18 MYOFASCIAL PAIN SYNDROME: ICD-10-CM

## 2022-10-17 DIAGNOSIS — M47.816 LUMBAR SPONDYLOSIS: ICD-10-CM

## 2022-10-17 DIAGNOSIS — M51.36 BULGE OF LUMBAR DISC WITHOUT MYELOPATHY: ICD-10-CM

## 2022-10-17 DIAGNOSIS — M54.50 CHRONIC BILATERAL LOW BACK PAIN WITHOUT SCIATICA: ICD-10-CM

## 2022-10-17 DIAGNOSIS — G89.4 CHRONIC PAIN SYNDROME: Primary | ICD-10-CM

## 2022-10-17 DIAGNOSIS — G89.29 CHRONIC BILATERAL LOW BACK PAIN WITHOUT SCIATICA: ICD-10-CM

## 2022-10-17 DIAGNOSIS — M54.16 LUMBAR RADICULOPATHY: ICD-10-CM

## 2022-10-17 PROCEDURE — 99214 OFFICE O/P EST MOD 30 MIN: CPT | Performed by: NURSE PRACTITIONER

## 2022-10-17 RX ORDER — GLIMEPIRIDE 4 MG/1
TABLET ORAL
COMMUNITY
Start: 2022-08-16

## 2022-10-17 RX ORDER — CELECOXIB 200 MG/1
CAPSULE ORAL
Qty: 60 CAPSULE | Refills: 3 | Status: SHIPPED | OUTPATIENT
Start: 2022-10-17

## 2022-10-17 RX ORDER — ATORVASTATIN CALCIUM 10 MG/1
10 TABLET, FILM COATED ORAL DAILY
COMMUNITY
Start: 2022-08-16

## 2022-10-17 RX ORDER — TIZANIDINE 2 MG/1
TABLET ORAL
Qty: 45 TABLET | Refills: 3 | Status: SHIPPED | OUTPATIENT
Start: 2022-10-17

## 2022-10-17 NOTE — PROGRESS NOTES
Assessment:  1  Chronic pain syndrome    2  Chronic bilateral low back pain without sciatica    3  Neck pain    4  Lumbar radiculopathy    5  Myofascial pain syndrome    6  Lumbar spondylosis    7  Bulge of lumbar disc without myelopathy        Plan:  While the patient was in the office today, I did have a thorough conversation with the patient regarding their chronic pain syndrome, symptoms, medication regimen, and treatment plan  I did discuss with the patient with regards to the small palpable mass in her right abdominal cavity, I explained the patient that it does seem very much like a fatty tumor or cyst such as a lipoma, however, I did encourage her to follow up with her primary care provider for further evaluation as she may need some kind of ultrasound or further imaging  The patient was agreeable and verbalized an understanding  With regards to her medication regimen, I discussed with the patient that since she is noting at least moderate stable overall relief of her chronic pain symptoms, I feel it is reasonable appropriate continue the Celebrex and the p r n  tizanidine as prescribed  The patient was agreeable and verbalized an understanding  She is to continue with her home exercise and stretching program, utilizing 20 minutes of low heat prior to her home exercises and stretching  The patient was agreeable and verbalized an understanding  The patient will follow-up in 4 months for medication prescription refill and reevaluation  The patient was advised to contact the office should their symptoms worsen in the interim  The patient was agreeable and verbalized an understanding  History of Present Illness:     The patient is a 61 y o  female last seen on 6/27/2022 who presents for a follow up office visit in regards to chronic pain syndrome, as the patient's pain has been ongoing for greater than a year, secondary to lumbar spondylosis with bulging lumbar discs with radiculopathy myofascial pain as well as chronic neck pain  The patient currently reports that since her last office visit her low back pain and radicular symptoms have remained relatively stable and manageable with her current medication regimen and her left shoulder pain has significantly resolved, however, currently for the past few days she is noted worsening right-sided cervical pain, however, the patient feels that she most likely slept wrong and is trying to use heat and stretching since that does seem to be helpful  The patient also reports that in her right lower abdomen she has had a palpable cyst for almost a year and she just wanted to know if we could take a look at it and what she should do with it  The patient reports that the cyst does not painful and does not feel like or seem as though it has increase in size and she does report she has an upcoming office visit with her primary care provider in January and is just wondering if she should be evaluated sooner or because it has been stable just have her primary evaluated in January at her follow-up visit  The patient presents today for regular medication follow-up visit  Current pain medications includes:  Tizanidine 2 mg, 1-2 pills at bedtime as needed for pain and spasms and Celebrex 200 mg b i d  p r n  for pain with food  The patient reports that this regimen is providing 40% pain relief  The patient is reporting no side effects from this pain medication regimen  I have personally reviewed and/or updated the patient's past medical history, past surgical history, family history, social history, current medications, allergies, and vital signs today  Review of Systems:    Review of Systems   Respiratory: Positive for shortness of breath  Cardiovascular: Negative for chest pain  Gastrointestinal: Negative for constipation, diarrhea, nausea and vomiting  Musculoskeletal: Positive for gait problem   Negative for arthralgias, joint swelling and myalgias  Skin: Negative for rash  Neurological: Negative for dizziness, seizures and weakness  All other systems reviewed and are negative  Past Medical History:   Diagnosis Date   • Asthma    • Diabetes ([facility] [address] )    • Hypertension        History reviewed  No pertinent surgical history  Family History   Problem Relation Age of Onset   • Hypertension Other    • Heart disease Other    • Cancer Other        Social History     Occupational History   • Not on file   Tobacco Use   • Smoking status: Never Smoker   • Smokeless tobacco: Never Used   Substance and Sexual Activity   • Alcohol use: Yes   • Drug use: Never   • Sexual activity: Not on file         Current Outpatient Medications:   •  celecoxib (CeleBREX) 200 mg capsule, Take 1 PO BID PRN for pain  DO NOT take any other NSAIDs while on this medication  , Disp: 60 capsule, Rfl: 3  •  doxycycline (DORYX) 100 MG EC tablet, Take 100 mg by mouth 2 (two) times a day, Disp: , Rfl:   •  escitalopram (LEXAPRO) 20 mg tablet, Take 20 mg by mouth daily, Disp: , Rfl:   •  Ferrous Sulfate (IRON PO), Take by mouth, Disp: , Rfl:   •  Levothyroxine Sodium 88 MCG CAPS, Take by mouth, Disp: , Rfl:   •  lisinopril (ZESTRIL) 20 mg tablet, Take 20 mg by mouth daily, Disp: , Rfl:   •  metFORMIN (GLUCOPHAGE) 500 mg tablet, Take 500 mg by mouth 2 (two) times a day with meals, Disp: , Rfl:   •  Multiple Vitamin (multivitamin) tablet, Take 1 tablet by mouth daily, Disp: , Rfl:   •  tiZANidine (ZANAFLEX) 2 mg tablet, Take 1-2 PO HS PRN for pain and spasms  , Disp: 45 tablet, Rfl: 3  •  atorvastatin (LIPITOR) 10 mg tablet, Take 10 mg by mouth daily, Disp: , Rfl:   •  fluticasone (VERAMYST) 27 5 MCG/SPRAY nasal spray, Administer  into both nostrils   (Patient not taking: Reported on 10/17/2022), Disp: , Rfl:   •  glimepiride (AMARYL) 4 mg tablet, 1 TABLET WITH BREAKFAST OR THE FIRST MAIN MEAL OF THE DAY ONCE A DAY 90 (Patient not taking: Reported on 10/17/2022), Disp: , Rfl:   •  ibuprofen (MOTRIN) 800 mg tablet, Take by mouth every 6 (six) hours as needed for mild pain (Patient not taking: No sig reported), Disp: , Rfl:   •  spironolactone (ALDACTONE) 100 mg tablet, Take 100 mg by mouth daily (Patient not taking: Reported on 10/17/2022), Disp: , Rfl:     Allergies   Allergen Reactions   • Cefazolin        Physical Exam:    /78 (BP Location: Left arm, Patient Position: Sitting, Cuff Size: Standard)   Pulse 76   Temp 98 4 °F (36 9 °C)   Ht 5' 7" (1 702 m)   Wt 114 kg (252 lb)   BMI 39 47 kg/m²     Constitutional:normal, well developed, well nourished, alert, in no distress and non-toxic and no overt pain behavior  and overweight  Eyes:anicteric  HEENT:grossly intact  Neck:supple, symmetric, trachea midline and no masses   Pulmonary:even and unlabored  Cardiovascular:No edema or pitting edema present  Skin:Normal without rashes or lesions and well hydrated  Psychiatric:Mood and affect appropriate  Neurologic:Cranial Nerves II-XII grossly intact  Musculoskeletal:The patient's gait is slightly antalgic, but steady without the use of any assistive devices  Upon examination I did palpate a small 2 cm long by approximately 1 cm wide palpable mass in her right lower quadrant more to the lateral aspect of her abdomen that was non pain full upon exam without any signs of erythema or edema noted upon exam       Imaging  No orders to display         No orders of the defined types were placed in this encounter

## 2022-11-01 ENCOUNTER — TELEPHONE (OUTPATIENT)
Dept: PAIN MEDICINE | Facility: CLINIC | Age: 57
End: 2022-11-01

## 2022-11-01 NOTE — TELEPHONE ENCOUNTER
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

## 2022-11-01 NOTE — TELEPHONE ENCOUNTER
Caller: Patient    Doctor: Rafita Mcwilliams    Reason for call: would like a call back regarding neck pain from a fall    Call back#: [phone]

## 2022-11-01 NOTE — TELEPHONE ENCOUNTER
I would recommend that she give rest, ice, heat, stretching and conservative measures at least another week and see how she does  If her pain does not improve or stabilize, she is to call us back and we can go from there  Thank you

## 2022-11-01 NOTE — TELEPHONE ENCOUNTER
Caller: Michelle     Doctor: Nohemy Yuen    Reason for call: patient fell on Saturday, her neck hurts what recommendations do you have?     Call back#: [phone]

## 2022-11-02 NOTE — TELEPHONE ENCOUNTER
Caller: Patient    Doctor/Office: Linda Mccracken asked to speak to: RN     Call was transferred to: RN

## 2023-01-30 ENCOUNTER — OFFICE VISIT (OUTPATIENT)
Dept: PAIN MEDICINE | Facility: CLINIC | Age: 58
End: 2023-01-30

## 2023-01-30 VITALS
WEIGHT: 242 LBS | DIASTOLIC BLOOD PRESSURE: 78 MMHG | HEART RATE: 80 BPM | TEMPERATURE: 97.6 F | SYSTOLIC BLOOD PRESSURE: 134 MMHG | HEIGHT: 67 IN | BODY MASS INDEX: 37.98 KG/M2

## 2023-01-30 DIAGNOSIS — M19.012 LOCALIZED OSTEOARTHRITIS OF LEFT SHOULDER: ICD-10-CM

## 2023-01-30 DIAGNOSIS — M79.18 MYOFASCIAL PAIN SYNDROME: ICD-10-CM

## 2023-01-30 DIAGNOSIS — M54.16 LUMBAR RADICULOPATHY: ICD-10-CM

## 2023-01-30 DIAGNOSIS — M47.816 LUMBAR SPONDYLOSIS: ICD-10-CM

## 2023-01-30 DIAGNOSIS — G89.29 CHRONIC LEFT SHOULDER PAIN: ICD-10-CM

## 2023-01-30 DIAGNOSIS — M25.512 CHRONIC LEFT SHOULDER PAIN: ICD-10-CM

## 2023-01-30 DIAGNOSIS — G89.29 CHRONIC BILATERAL LOW BACK PAIN WITHOUT SCIATICA: ICD-10-CM

## 2023-01-30 DIAGNOSIS — M54.50 CHRONIC BILATERAL LOW BACK PAIN WITHOUT SCIATICA: ICD-10-CM

## 2023-01-30 DIAGNOSIS — G89.4 CHRONIC PAIN SYNDROME: Primary | ICD-10-CM

## 2023-01-30 DIAGNOSIS — M54.2 NECK PAIN: ICD-10-CM

## 2023-01-30 DIAGNOSIS — M51.36 BULGE OF LUMBAR DISC WITHOUT MYELOPATHY: ICD-10-CM

## 2023-01-30 RX ORDER — CELECOXIB 200 MG/1
CAPSULE ORAL
Qty: 60 CAPSULE | Refills: 3 | Status: SHIPPED | OUTPATIENT
Start: 2023-01-30

## 2023-01-30 RX ORDER — TIZANIDINE 2 MG/1
TABLET ORAL
Qty: 45 TABLET | Refills: 4 | Status: SHIPPED | OUTPATIENT
Start: 2023-01-30

## 2023-01-30 NOTE — PROGRESS NOTES
Assessment:  1  Chronic pain syndrome    2  Chronic left shoulder pain    3  Chronic bilateral low back pain without sciatica    4  Neck pain    5  Lumbar radiculopathy    6  Myofascial pain syndrome    7  Localized osteoarthritis of left shoulder    8  Lumbar spondylosis    9  Bulge of lumbar disc without myelopathy        Plan:  While the patient was in the office today, I did have a thorough conversation with the patient regarding their chronic pain syndrome, symptoms, and medication regimen/treatment plan  I discussed with the patient that since her previous x-rays did show mild osteoarthritis and I feel that there is definite component of bursitis, it would be beneficial to try a left shoulder injection to see if that would help address inflammatory component and decrease her pain  The patient was agreeable and verbalized an understanding  Complete risks and benefits including bleeding, infection, tissue reaction, nerve injury and allergic reaction were discussed  The approach was demonstrated using models and literature was provided  Verbal and written consent was obtained  With regards to her medication regimen, I discussed with the patient that at this point time since she is noting mild to moderate overall relief of her chronic pain symptoms, without side effects or issues, I feel it is reasonable and appropriate continue the Celebrex and tizanidine as prescribed  The patient was agreeable and verbalized an understanding      We also discussed the possibility that once we see how she does from the shoulder injection we may also consider referring her to chiropractic treatment and I did explain to the patient with her insurance the best way to start to try to find a chiropractors for her to call her insurance to see if they cover chiropractic treatment and then if they could give her a list of the chiropractors that they cover and that she can decide who she would like to see and we can put in a referral   The patient was agreeable and verbalized an understanding  The patient will follow-up in 4 months for medication prescription refill and reevaluation  The patient was advised to contact the office should their symptoms worsen in the interim  The patient was agreeable and verbalized an understanding  History of Present Illness: The patient is a 61 y o  female last seen on 10/17/2022 who presents for a follow up office visit in regards to chronic pain syndrome, as the patient's pain has been ongoing for greater than a year, secondary to bulging lumbar disc with spondylosis and radiculopathy as well as chronic left shoulder pain with osteoarthritis  The patient currently reports that since her last office visit overall most of her pain symptoms have remained manageable, except for her left shoulder continues to be an issue and just does not seem to improve and wanted to know what we could do with regards to treatment of the shoulder as she has given it enough time and it does not seem to have gotten any better  The patient presents today for regular medication follow-up visit and to discuss her treatment plan options  Current pain medications includes: Celebrex 200 mg twice daily as needed for pain and tizanidine 2 mg, 1 to 2 pills at bedtime as needed for pain and spasms  The patient reports that this regimen is providing 30-40% pain relief  The patient is reporting no side effects from this pain medication regimen  I have personally reviewed and/or updated the patient's past medical history, past surgical history, family history, social history, current medications, allergies, and vital signs today  Review of Systems:    Review of Systems   Respiratory: Negative for shortness of breath  Cardiovascular: Negative for chest pain  Gastrointestinal: Negative for constipation, diarrhea, nausea and vomiting  Musculoskeletal: Positive for gait problem   Negative for arthralgias, joint swelling and myalgias  Skin: Negative for rash  Neurological: Negative for dizziness, seizures and weakness  All other systems reviewed and are negative  Past Medical History:   Diagnosis Date   • Asthma    • Diabetes ([facility] [address] )    • Hypertension        History reviewed  No pertinent surgical history  Family History   Problem Relation Age of Onset   • Hypertension Other    • Heart disease Other    • Cancer Other        Social History     Occupational History   • Not on file   Tobacco Use   • Smoking status: Never   • Smokeless tobacco: Never   Substance and Sexual Activity   • Alcohol use: Yes   • Drug use: Never   • Sexual activity: Not on file         Current Outpatient Medications:   •  atorvastatin (LIPITOR) 10 mg tablet, Take 10 mg by mouth daily, Disp: , Rfl:   •  celecoxib (CeleBREX) 200 mg capsule, Take 1 PO BID PRN for pain  DO NOT take any other NSAIDs while on this medication  , Disp: 60 capsule, Rfl: 3  •  doxycycline (DORYX) 100 MG EC tablet, Take 100 mg by mouth 2 (two) times a day, Disp: , Rfl:   •  escitalopram (LEXAPRO) 20 mg tablet, Take 20 mg by mouth daily, Disp: , Rfl:   •  Ferrous Sulfate (IRON PO), Take by mouth, Disp: , Rfl:   •  fluticasone (VERAMYST) 27 5 MCG/SPRAY nasal spray, , Disp: , Rfl:   •  glimepiride (AMARYL) 4 mg tablet, , Disp: , Rfl:   •  lisinopril (ZESTRIL) 20 mg tablet, Take 20 mg by mouth daily, Disp: , Rfl:   •  metFORMIN (GLUCOPHAGE) 500 mg tablet, Take 500 mg by mouth 2 (two) times a day with meals, Disp: , Rfl:   •  Multiple Vitamin (multivitamin) tablet, Take 1 tablet by mouth daily, Disp: , Rfl:   •  tiZANidine (ZANAFLEX) 2 mg tablet, Take 1-2 PO HS PRN for pain and spasms  , Disp: 45 tablet, Rfl: 4  •  ibuprofen (MOTRIN) 800 mg tablet, Take by mouth every 6 (six) hours as needed for mild pain (Patient not taking: Reported on 6/27/2022), Disp: , Rfl:   •  Levothyroxine Sodium 88 MCG CAPS, Take by mouth (Patient not taking: Reported on 1/30/2023), Disp: , Rfl:   •  spironolactone (ALDACTONE) 100 mg tablet, Take 100 mg by mouth daily (Patient not taking: Reported on 10/17/2022), Disp: , Rfl:     Allergies   Allergen Reactions   • Cefazolin        Physical Exam:    /78 (BP Location: Left arm, Patient Position: Sitting, Cuff Size: Large)   Pulse 80   Temp 97 6 °F (36 4 °C)   Ht 5' 7" (1 702 m)   Wt 110 kg (242 lb)   BMI 37 90 kg/m²     Constitutional:normal, well developed, well nourished, alert, in no distress and non-toxic and no overt pain behavior  and overweight  Eyes:anicteric  HEENT:grossly intact  Neck:supple, symmetric, trachea midline and no masses   Pulmonary:even and unlabored  Cardiovascular:No edema or pitting edema present  Skin:Normal without rashes or lesions and well hydrated  Psychiatric:Mood and affect appropriate  Neurologic:Cranial Nerves II-XII grossly intact  Musculoskeletal:The patient's gait is slightly antalgic, but steady without the use of any assistive devices        Imaging  FL spine and pain procedure    (Results Pending)         Orders Placed This Encounter   Procedures   • FL spine and pain procedure

## 2023-02-02 ENCOUNTER — HOSPITAL ENCOUNTER (OUTPATIENT)
Dept: RADIOLOGY | Facility: CLINIC | Age: 58
Discharge: HOME | End: 2023-02-02
Admitting: ANESTHESIOLOGY

## 2023-02-02 VITALS
HEART RATE: 85 BPM | TEMPERATURE: 97 F | RESPIRATION RATE: 18 BRPM | OXYGEN SATURATION: 94 % | DIASTOLIC BLOOD PRESSURE: 85 MMHG | SYSTOLIC BLOOD PRESSURE: 144 MMHG

## 2023-02-02 DIAGNOSIS — M19.012 LOCALIZED OSTEOARTHRITIS OF LEFT SHOULDER: ICD-10-CM

## 2023-02-02 DIAGNOSIS — G89.29 CHRONIC LEFT SHOULDER PAIN: ICD-10-CM

## 2023-02-02 DIAGNOSIS — M25.512 CHRONIC LEFT SHOULDER PAIN: ICD-10-CM

## 2023-02-02 RX ORDER — METHYLPREDNISOLONE ACETATE 80 MG/ML
80 INJECTION, SUSPENSION INTRA-ARTICULAR; INTRALESIONAL; INTRAMUSCULAR; PARENTERAL; SOFT TISSUE ONCE
Status: COMPLETED | OUTPATIENT
Start: 2023-02-02 | End: 2023-02-02

## 2023-02-02 RX ADMIN — LIDOCAINE HYDROCHLORIDE 2 ML: 20 INJECTION, SOLUTION EPIDURAL; INFILTRATION; INTRACAUDAL at 10:43

## 2023-02-02 RX ADMIN — METHYLPREDNISOLONE ACETATE 80 MG: 80 INJECTION, SUSPENSION INTRA-ARTICULAR; INTRALESIONAL; INTRAMUSCULAR; SOFT TISSUE at 10:44

## 2023-02-02 RX ADMIN — IOHEXOL 1 ML: 300 INJECTION, SOLUTION INTRAVENOUS at 10:43

## 2023-02-02 NOTE — H&P
History of Present Illness: The patient is a 61 y o  female who presents with complaints of shoulder pain  Past Medical History:   Diagnosis Date   • Asthma    • Diabetes ([address] )    • Hypertension        No past surgical history on file  Current Outpatient Medications:   •  atorvastatin (LIPITOR) 10 mg tablet, Take 10 mg by mouth daily, Disp: , Rfl:   •  celecoxib (CeleBREX) 200 mg capsule, Take 1 PO BID PRN for pain  DO NOT take any other NSAIDs while on this medication  , Disp: 60 capsule, Rfl: 3  •  doxycycline (DORYX) 100 MG EC tablet, Take 100 mg by mouth 2 (two) times a day, Disp: , Rfl:   •  escitalopram (LEXAPRO) 20 mg tablet, Take 20 mg by mouth daily, Disp: , Rfl:   •  Ferrous Sulfate (IRON PO), Take by mouth, Disp: , Rfl:   •  fluticasone (VERAMYST) 27 5 MCG/SPRAY nasal spray, , Disp: , Rfl:   •  glimepiride (AMARYL) 4 mg tablet, , Disp: , Rfl:   •  ibuprofen (MOTRIN) 800 mg tablet, Take by mouth every 6 (six) hours as needed for mild pain (Patient not taking: Reported on 6/27/2022), Disp: , Rfl:   •  Levothyroxine Sodium 88 MCG CAPS, Take by mouth (Patient not taking: Reported on 1/30/2023), Disp: , Rfl:   •  lisinopril (ZESTRIL) 20 mg tablet, Take 20 mg by mouth daily, Disp: , Rfl:   •  metFORMIN (GLUCOPHAGE) 500 mg tablet, Take 500 mg by mouth 2 (two) times a day with meals, Disp: , Rfl:   •  Multiple Vitamin (multivitamin) tablet, Take 1 tablet by mouth daily, Disp: , Rfl:   •  spironolactone (ALDACTONE) 100 mg tablet, Take 100 mg by mouth daily (Patient not taking: Reported on 10/17/2022), Disp: , Rfl:   •  tiZANidine (ZANAFLEX) 2 mg tablet, Take 1-2 PO HS PRN for pain and spasms  , Disp: 45 tablet, Rfl: 4    Current Facility-Administered Medications:   •  iohexol (OMNIPAQUE) 300 mg/mL injection 50 mL, 50 mL, Intra-articular, Once, Darío Walls DO  •  lidocaine (PF) (XYLOCAINE-MPF) 2 % injection 5 mL, 5 mL, Intra-articular, Once, Darío Walls DO  •  methylPREDNISolone acetate (DEPO-MEDROL) injection 80 mg, 80 mg, Intra-articular, Once, Darío Walls DO    Allergies   Allergen Reactions   • Cefazolin        Physical Exam:   Vitals:    02/02/23 1037   BP: 148/95   Pulse: 99   Resp: 18   Temp: (!) 97 °F (36 1 °C)   SpO2: 98%     General: Awake, Alert, Oriented x 3, Mood and affect appropriate  Respiratory: Respirations even and unlabored  Cardiovascular: Peripheral pulses intact; no edema  Musculoskeletal Exam: decreased range of motion of shoulder    ASA Score: III    Patient/Chart Verification  Patient ID Verified: Verbal  ID Band Applied: No  Consents Confirmed: Procedural, To be obtained in the Pre-Procedure area  Interval H&P(within 24 hr) Complete (required for Outpatients and Surgery Admit only): To be obtained in the Pre-Procedure area  Allergies Reviewed: Yes  Anticoag/NSAID held?: NA  Currently on antibiotics?: No  Pregnancy denied?: NA    Assessment:   1  Localized osteoarthritis of left shoulder    2   Chronic left shoulder pain        Plan: Left shoulder injection under fluoroscopic guidance

## 2023-02-02 NOTE — DISCHARGE INSTRUCTIONS
Steroid Joint Injection   WHAT YOU NEED TO KNOW:   A steroid joint injection is a procedure to inject steroid medicine into a joint  Steroid medicine decreases pain and inflammation  The injection may also contain an anesthetic (numbing medicine) to decrease pain  It may be done to treat conditions such as arthritis, gout, or carpal tunnel syndrome  The injections may be given in your knee, ankle, shoulder, elbow, wrist, ankle or sacroiliac joint  Do not apply heat to any area that is numb  If you have discomfort or soreness at the injection site, you may apply ice today, 20 minutes on and 20 minutes off  Tomorrow you may use ice or warm, moist heat  Do not apply ice or heat directly to the skin  You may have an increase or change in the discomfort for 36-48 hours after your treatment  Apply ice and continue with any pain medicine you have been prescribed  Do not do anything strenuous today  You may shower, but no tub baths or hot tubs today  You may resume your normal activities tomorrow, but do not “overdo it”  Resume normal activities slowly when you are feeling better  If you experience redness, drainage or swelling at the injection site, or if you develop a fever above 100 degrees, please call [facility] at [phone] or go to the Emergency Room  Continue to take all routine medicines prescribed by your primary care physician unless otherwise instructed by our staff  Most blood thinners should be started again according to your regularly scheduled dosing  If you have any questions, please give our office a call  As no general anesthesia was used in today's procedure, you should not experience any side effects related to anesthesia  If you are diabetic, the steroids used in today's injection may temporarily increase your blood sugar levels after the first few days after your injection   Please keep a close eye on your sugars and alert the doctor who manages your diabetes if your sugars are significantly high from your baseline or you are symptomatic  If you have a problem specifically related to your procedure, please call our office at [phone]  Problems not related to your procedure should be directed to your primary care physician

## 2023-02-09 ENCOUNTER — TELEPHONE (OUTPATIENT)
Dept: PAIN MEDICINE | Facility: CLINIC | Age: 58
End: 2023-02-09

## 2023-02-09 NOTE — TELEPHONE ENCOUNTER
1st attempt  Lm to cb with % improvement and pain level.     Left Shoulder injection 2/2 , F/u 5/15

## 2023-02-13 NOTE — TELEPHONE ENCOUNTER
Caller: patient   Doctor/office: Titi German  #: [phone]    % of improvement: 10  Pain Scale (1-10):  8/10 on / off

## 2023-05-11 PROBLEM — M51.16 INTERVERTEBRAL DISC DISORDER WITH RADICULOPATHY OF LUMBAR REGION: Status: ACTIVE | Noted: 2019-12-31

## 2023-05-11 PROBLEM — M48.061 FORAMINAL STENOSIS OF LUMBAR REGION: Status: ACTIVE | Noted: 2023-05-11

## 2023-05-15 ENCOUNTER — OFFICE VISIT (OUTPATIENT)
Dept: PAIN MEDICINE | Facility: CLINIC | Age: 58
End: 2023-05-15

## 2023-05-15 VITALS
DIASTOLIC BLOOD PRESSURE: 80 MMHG | WEIGHT: 243 LBS | SYSTOLIC BLOOD PRESSURE: 125 MMHG | TEMPERATURE: 97.3 F | HEIGHT: 67 IN | BODY MASS INDEX: 38.14 KG/M2

## 2023-05-15 DIAGNOSIS — M25.512 CHRONIC LEFT SHOULDER PAIN: ICD-10-CM

## 2023-05-15 DIAGNOSIS — G89.29 CHRONIC BILATERAL LOW BACK PAIN, UNSPECIFIED WHETHER SCIATICA PRESENT: ICD-10-CM

## 2023-05-15 DIAGNOSIS — M48.061 FORAMINAL STENOSIS OF LUMBAR REGION: ICD-10-CM

## 2023-05-15 DIAGNOSIS — M51.36 BULGE OF LUMBAR DISC WITHOUT MYELOPATHY: ICD-10-CM

## 2023-05-15 DIAGNOSIS — M19.012 LOCALIZED OSTEOARTHRITIS OF LEFT SHOULDER: ICD-10-CM

## 2023-05-15 DIAGNOSIS — M47.816 LUMBAR SPONDYLOSIS: ICD-10-CM

## 2023-05-15 DIAGNOSIS — M54.50 CHRONIC BILATERAL LOW BACK PAIN, UNSPECIFIED WHETHER SCIATICA PRESENT: ICD-10-CM

## 2023-05-15 DIAGNOSIS — M79.18 MYOFASCIAL PAIN SYNDROME: ICD-10-CM

## 2023-05-15 DIAGNOSIS — G89.4 CHRONIC PAIN SYNDROME: Primary | ICD-10-CM

## 2023-05-15 DIAGNOSIS — G89.29 CHRONIC BILATERAL LOW BACK PAIN WITHOUT SCIATICA: ICD-10-CM

## 2023-05-15 DIAGNOSIS — G89.29 CHRONIC LEFT SHOULDER PAIN: ICD-10-CM

## 2023-05-15 DIAGNOSIS — M51.16 INTERVERTEBRAL DISC DISORDER WITH RADICULOPATHY OF LUMBAR REGION: ICD-10-CM

## 2023-05-15 DIAGNOSIS — M54.16 LUMBAR RADICULOPATHY: ICD-10-CM

## 2023-05-15 DIAGNOSIS — M54.50 CHRONIC BILATERAL LOW BACK PAIN WITHOUT SCIATICA: ICD-10-CM

## 2023-05-15 DIAGNOSIS — M54.2 NECK PAIN: ICD-10-CM

## 2023-05-15 RX ORDER — CELECOXIB 200 MG/1
CAPSULE ORAL
Qty: 60 CAPSULE | Refills: 5 | Status: SHIPPED | OUTPATIENT
Start: 2023-05-15

## 2023-05-15 RX ORDER — TIZANIDINE 2 MG/1
TABLET ORAL
Qty: 45 TABLET | Refills: 5 | Status: SHIPPED | OUTPATIENT
Start: 2023-05-15

## 2023-05-15 NOTE — PROGRESS NOTES
Assessment:  1  Chronic pain syndrome    2  Chronic bilateral low back pain, unspecified whether sciatica present    3  Lumbar spondylosis    4  Intervertebral disc disorder with radiculopathy of lumbar region    5  Foraminal stenosis of lumbar region    6  Chronic left shoulder pain    7  Localized osteoarthritis of left shoulder    8  Chronic bilateral low back pain without sciatica    9  Lumbar radiculopathy    10  Bulge of lumbar disc without myelopathy    11  Neck pain    12  Myofascial pain syndrome        Plan:  The patient is a 61 y o  female last seen on 02/02/2023 who presents for a follow up office visit in regards to chronic pain secondary to low back pain, lumbar disc herniation and foraminal stenosis, and chronic left shoulder pain due to mild osteoarthritis  The patient presents today with minimal pain complaints  She underwent a left shoulder injection in February, which had provided 80% pain relief which is ongoing  She is also underwent a bilateral L3-L5 radiofrequency ablation in 2020 which continues to provide moderate pain relief  At this time, the patient's pain is manageable  She takes tizanidine 2 mg 1 to 2 tablets at bedtime and Celebrex 200 mg 1 tablet twice a day as needed which also provides additional pain relief  Therefore, I will continue her on the medication as prescribed  Prescriptions for tizanidine and Celebrex were sent to her pharmacy  In regards to the shoulder pain, she was made aware joint injections typically provide 3 to 6 months of pain relief  If the pain would return in 3 months, an injection can be repeated  We also discussed the radiofrequency ablation she had from her lumbar spine  I did explain to the patient that it typically provides 1 to 2 years of pain relief  If this pain would return, a radiofrequency ablation can be repeated  The patient will follow-up in 6 months for medication prescription refill and reevaluation   The patient was advised to contact the office should their symptoms worsen in the interim  The patient was agreeable and verbalized an understanding  History of Present Illness: The patient is a 61 y o  female last seen on 02/02/2023 who presents for a follow up office visit in regards to chronic pain secondary to low back pain, lumbar disc herniation and foraminal stenosis, and chronic left shoulder pain due to mild osteoarthritis  Her last office visit was February 2, 2023 in which she had a left shoulder injection  Patient presents today with minimal pain relief  She states she has had little pain  She feels the left shoulder injection provided 80% pain relief  The pain occurs mostly at night  She uses heat which helps  She describes it as dull-aching, sharp, and throbbing  She is rating her pain a 2/3-10 on the numeric rating scale  She had a lumbar radiofrequency ablation bilateral L3-L5 in 2020 which provided significant relief which is ongoing  Current medications include Celebrex 200 mg 1 tablet twice a day and tizanidine 2 mg 1-2 tablets at bedtime  The medication provides moderate relief without side effects  She does have complaints of tinnitus in both ears  She will be seeing her PCP in June  I have personally reviewed and/or updated the patient's past medical history, past surgical history, family history, social history, current medications, allergies, and vital signs today  Review of Systems:    Review of Systems      Past Medical History:   Diagnosis Date   • Asthma    • Diabetes ([facility] [address] )    • Hypertension        History reviewed  No pertinent surgical history      Family History   Problem Relation Age of Onset   • Hypertension Other    • Heart disease Other    • Cancer Other        Social History     Occupational History   • Not on file   Tobacco Use   • Smoking status: Former     Packs/day: 0 25     Types: Cigarettes     Quit date: 2020     Years since quitting: 3 3   • Smokeless tobacco: "Never   Vaping Use   • Vaping Use: Former   • Substances: Nicotine   Substance and Sexual Activity   • Alcohol use: Yes   • Drug use: Never   • Sexual activity: Not on file         Current Outpatient Medications:   •  atorvastatin (LIPITOR) 10 mg tablet, Take 10 mg by mouth daily, Disp: , Rfl:   •  celecoxib (CeleBREX) 200 mg capsule, Take 1 PO BID PRN for pain  Take with food  DO NOT take any other NSAIDs while on this medication  , Disp: 60 capsule, Rfl: 5  •  doxycycline (DORYX) 100 MG EC tablet, Take 100 mg by mouth 2 (two) times a day, Disp: , Rfl:   •  escitalopram (LEXAPRO) 20 mg tablet, Take 20 mg by mouth daily, Disp: , Rfl:   •  Ferrous Sulfate (IRON PO), Take by mouth, Disp: , Rfl:   •  fluticasone (VERAMYST) 27 5 MCG/SPRAY nasal spray, , Disp: , Rfl:   •  glimepiride (AMARYL) 4 mg tablet, 2 mg, Disp: , Rfl:   •  lisinopril (ZESTRIL) 20 mg tablet, Take 20 mg by mouth daily, Disp: , Rfl:   •  metFORMIN (GLUCOPHAGE) 500 mg tablet, Take 500 mg by mouth 2 (two) times a day with meals, Disp: , Rfl:   •  Multiple Vitamin (multivitamin) tablet, Take 1 tablet by mouth daily, Disp: , Rfl:   •  spironolactone (ALDACTONE) 100 mg tablet, Take 100 mg by mouth daily, Disp: , Rfl:   •  tiZANidine (ZANAFLEX) 2 mg tablet, Take 1-2 PO HS PRN for pain and spasms  , Disp: 45 tablet, Rfl: 5    Allergies   Allergen Reactions   • Cefazolin        Physical Exam:    /80 (BP Location: Left arm, Patient Position: Sitting, Cuff Size: Adult)   Temp (!) 97 3 °F (36 3 °C)   Ht 5' 7\" (1 702 m) Comment: verbal  Wt 110 kg (243 lb)   BMI 38 06 kg/m²     Constitutional:normal, well developed, well nourished, alert, in no distress and non-toxic and no overt pain behavior    Eyes:anicteric  HEENT:grossly intact  Neck:supple, symmetric, trachea midline and no masses   Pulmonary:even and unlabored  Cardiovascular:No edema or pitting edema present  Skin:Normal without rashes or lesions and well hydrated  Psychiatric:Mood and affect " appropriate  Neurologic:Cranial Nerves II-XII grossly intact  Musculoskeletal:normal      Imaging          No orders of the defined types were placed in this encounter

## 2023-11-09 ENCOUNTER — OFFICE VISIT (OUTPATIENT)
Dept: PAIN MEDICINE | Facility: CLINIC | Age: 58
End: 2023-11-09
Payer: COMMERCIAL

## 2023-11-09 VITALS
HEIGHT: 67 IN | DIASTOLIC BLOOD PRESSURE: 76 MMHG | SYSTOLIC BLOOD PRESSURE: 128 MMHG | BODY MASS INDEX: 38.92 KG/M2 | HEART RATE: 84 BPM | TEMPERATURE: 97.8 F | WEIGHT: 248 LBS

## 2023-11-09 DIAGNOSIS — M51.16 INTERVERTEBRAL DISC DISORDER WITH RADICULOPATHY OF LUMBAR REGION: ICD-10-CM

## 2023-11-09 DIAGNOSIS — M47.816 LUMBAR FACET ARTHROPATHY: Primary | ICD-10-CM

## 2023-11-09 DIAGNOSIS — G89.29 CHRONIC BILATERAL LOW BACK PAIN, UNSPECIFIED WHETHER SCIATICA PRESENT: ICD-10-CM

## 2023-11-09 DIAGNOSIS — M25.512 CHRONIC LEFT SHOULDER PAIN: ICD-10-CM

## 2023-11-09 DIAGNOSIS — G89.29 CHRONIC LEFT SHOULDER PAIN: ICD-10-CM

## 2023-11-09 DIAGNOSIS — M54.50 CHRONIC BILATERAL LOW BACK PAIN, UNSPECIFIED WHETHER SCIATICA PRESENT: ICD-10-CM

## 2023-11-09 DIAGNOSIS — M47.816 LUMBAR SPONDYLOSIS: ICD-10-CM

## 2023-11-09 DIAGNOSIS — G89.4 CHRONIC PAIN SYNDROME: ICD-10-CM

## 2023-11-09 DIAGNOSIS — M19.012 LOCALIZED OSTEOARTHRITIS OF LEFT SHOULDER: ICD-10-CM

## 2023-11-09 DIAGNOSIS — M79.18 MYOFASCIAL PAIN SYNDROME: ICD-10-CM

## 2023-11-09 PROCEDURE — 99214 OFFICE O/P EST MOD 30 MIN: CPT | Performed by: NURSE PRACTITIONER

## 2023-11-09 RX ORDER — TIZANIDINE 2 MG/1
TABLET ORAL
Qty: 75 TABLET | Refills: 5 | Status: SHIPPED | OUTPATIENT
Start: 2023-11-09

## 2023-11-09 RX ORDER — CELECOXIB 200 MG/1
CAPSULE ORAL
Qty: 60 CAPSULE | Refills: 5 | Status: SHIPPED | OUTPATIENT
Start: 2023-11-09

## 2023-11-09 NOTE — PROGRESS NOTES
Assessment:  1. Lumbar facet arthropathy    2. Intervertebral disc disorder with radiculopathy of lumbar region    3. Lumbar spondylosis    4. Localized osteoarthritis of left shoulder    5. Chronic left shoulder pain    6. Chronic bilateral low back pain, unspecified whether sciatica present    7. Chronic pain syndrome    8. Myofascial pain syndrome        Plan:  The patient is a 60 y.o. female last seen on 5/15/2023 who presents for a follow up office visit in regards to chronic pain secondary to low back pain, lumbar disc herniation, lumbar foraminal stenosis, and chronic left shoulder pain due to mild osteoarthritis. The patient presents today with ongoing low back pain. The pain has worsened since the last office visit. Upon examination her pain appears facet mediated. She had underwent a bilateral L3-L5 radiofrequency ablation in 2020 which had provided significant pain relief until recently. At this time her nerve roots have most likely regenerated. Therefore, I recommend a bilateral L3-L5 medical branch block injection-2% lidocaine. If she has a positive response, she would be a candidate for the radiofrequency ablation procedure. Complete risks and benefits including bleeding, infection, tissue reaction, nerve injury and allergic reaction were discussed. The approach was demonstrated using models and literature was provided. Verbal and written consent was obtained. The patient will follow-up in 6 months for medication prescription refill and reevaluation. The patient was advised to contact the office should their symptoms worsen in the interim. The patient was agreeable and verbalized an understanding. History of Present Illness: The patient is a 60 y.o. female last seen on 5/15/2023 who presents for a follow up office visit in regards to chronic pain secondary to low back pain, lumbar disc herniation, lumbar foraminal stenosis, and chronic left shoulder pain due to mild osteoarthritis. Her last office visit was May 15, 2023, in which she was continued on Celebrex 200 mg 1 tablet twice a day and tizanidine 2 mg 1 to 2 tablets at bedtime. The patient presents today with ongoing low back pain. She feels the back pain has increased since the last office visit. The pain is located across the low back. She also has pain in the left shoulder. The pain is intermittent and described as dull aching, throbbing. She is rating her pain a 7-8/10 on the numeric rating scale. Current medications include Celebrex 200 mg 1 tablet twice a day and tizanidine 2 mg 1 to 2 tablets at bedtime. The medication is providing moderate pain relief without side effects. She had a lumbar radiofrequency ablation bilateral L3-L5 in 2020 which provided significant relief. She also had a left shoulder injection in February 2023 which since states today provided 10% pain relief. I have personally reviewed and/or updated the patient's past medical history, past surgical history, family history, social history, current medications, allergies, and vital signs today. Review of Systems:    Review of Systems   Respiratory:  Negative for shortness of breath. Cardiovascular:  Negative for chest pain. Gastrointestinal:  Negative for constipation, diarrhea, nausea and vomiting. Musculoskeletal:  Positive for gait problem. Negative for arthralgias, joint swelling and myalgias. Skin:  Negative for rash. Neurological:  Positive for weakness. Negative for dizziness and seizures. All other systems reviewed and are negative. Past Medical History:   Diagnosis Date    Asthma     Diabetes ([address])     Hypertension        History reviewed. No pertinent surgical history.     Family History   Problem Relation Age of Onset    Hypertension Other     Heart disease Other     Cancer Other        Social History     Occupational History    Not on file   Tobacco Use    Smoking status: Former     Packs/day: 0.25     Types: Cigarettes     Quit date: 2020     Years since quitting: 3.8    Smokeless tobacco: Never   Vaping Use    Vaping Use: Former    Substances: Nicotine   Substance and Sexual Activity    Alcohol use: Yes    Drug use: Never    Sexual activity: Not on file         Current Outpatient Medications:     atorvastatin (LIPITOR) 10 mg tablet, Take 10 mg by mouth daily, Disp: , Rfl:     celecoxib (CeleBREX) 200 mg capsule, Take 1 PO BID PRN for pain. Take with food. DO NOT take any other NSAIDs while on this medication. , Disp: 60 capsule, Rfl: 5    doxycycline (DORYX) 100 MG EC tablet, Take 100 mg by mouth 2 (two) times a day, Disp: , Rfl:     escitalopram (LEXAPRO) 20 mg tablet, Take 20 mg by mouth daily, Disp: , Rfl:     Ferrous Sulfate (IRON PO), Take by mouth, Disp: , Rfl:     fluticasone (VERAMYST) 27.5 MCG/SPRAY nasal spray, , Disp: , Rfl:     glimepiride (AMARYL) 4 mg tablet, 2 mg, Disp: , Rfl:     lisinopril (ZESTRIL) 20 mg tablet, Take 20 mg by mouth daily, Disp: , Rfl:     metFORMIN (GLUCOPHAGE) 500 mg tablet, Take 500 mg by mouth 2 (two) times a day with meals, Disp: , Rfl:     Multiple Vitamin (multivitamin) tablet, Take 1 tablet by mouth daily, Disp: , Rfl:     spironolactone (ALDACTONE) 100 mg tablet, Take 100 mg by mouth daily, Disp: , Rfl:     tiZANidine (ZANAFLEX) 2 mg tablet, Take 1 tab in the a.m. and 1.5  PO HS PRN for pain and spasms. , Disp: 75 tablet, Rfl: 5    Allergies   Allergen Reactions    Cefazolin        Physical Exam:    /76 (BP Location: Left arm, Patient Position: Sitting, Cuff Size: Large)   Pulse 84   Temp 97.8 °F (36.6 °C)   Ht 5' 7" (1.702 m)   Wt 112 kg (248 lb)   BMI 38.84 kg/m²     Constitutional:normal, well developed, well nourished, alert, in no distress and non-toxic and no overt pain behavior.   Eyes:anicteric  HEENT:grossly intact  Neck:supple, symmetric, trachea midline and no masses   Pulmonary:even and unlabored  Cardiovascular:No edema or pitting edema present  Skin:Normal without rashes or lesions and well hydrated  Psychiatric:Mood and affect appropriate  Neurologic:Cranial Nerves II-XII grossly intact  Musculoskeletal:normal    Lumbar Spine Exam    Appearance:  Normal lordosis  Palpation/Tenderness:  left lumbar paraspinal tenderness  right lumbar paraspinal tenderness  Range of Motion:  Flexion:  Minimally limited  without pain  Extension:  Minimally limited  with pain  Lateral Flexion - Left:  Minimally limited  with pain  Lateral Flexion - Right:  Minimally limited  with pain  Special Tests:  Pain with facet loading bilaterally     Imaging      FL spine and pain procedure    (Results Pending)         Orders Placed This Encounter   Procedures    FL spine and pain procedure

## 2023-11-28 ENCOUNTER — TELEPHONE (OUTPATIENT)
Dept: PAIN MEDICINE | Facility: CLINIC | Age: 58
End: 2023-11-28

## 2023-11-28 NOTE — TELEPHONE ENCOUNTER
Caller: Michelle     Doctor: Mary Márquez    Reason for call: patient is scheduled on 12/4 has opportunity to work wondering how far the next procedure date is?     Call back#: [phone]

## 2023-12-15 ENCOUNTER — HOSPITAL ENCOUNTER (OUTPATIENT)
Dept: RADIOLOGY | Facility: CLINIC | Age: 58
Discharge: HOME | End: 2023-12-15
Payer: COMMERCIAL

## 2023-12-15 VITALS
RESPIRATION RATE: 18 BRPM | HEART RATE: 102 BPM | DIASTOLIC BLOOD PRESSURE: 80 MMHG | OXYGEN SATURATION: 94 % | TEMPERATURE: 97 F | SYSTOLIC BLOOD PRESSURE: 131 MMHG

## 2023-12-15 DIAGNOSIS — M47.816 LUMBAR FACET ARTHROPATHY: ICD-10-CM

## 2023-12-15 PROCEDURE — 64494 INJ PARAVERT F JNT L/S 2 LEV: CPT | Performed by: ANESTHESIOLOGY

## 2023-12-15 PROCEDURE — 64493 INJ PARAVERT F JNT L/S 1 LEV: CPT | Performed by: ANESTHESIOLOGY

## 2023-12-15 RX ADMIN — LIDOCAINE HYDROCHLORIDE 6 ML: 20 INJECTION, SOLUTION EPIDURAL; INFILTRATION; INTRACAUDAL at 10:35

## 2023-12-15 NOTE — DISCHARGE INSTRUCTIONS

## 2023-12-15 NOTE — H&P
History of Present Illness: The patient is a 60 y.o. female who presents with complaints of low back pain. Past Medical History:   Diagnosis Date    Asthma     Diabetes ([address])     Hypertension        No past surgical history on file. Current Outpatient Medications:     atorvastatin (LIPITOR) 10 mg tablet, Take 10 mg by mouth daily, Disp: , Rfl:     celecoxib (CeleBREX) 200 mg capsule, Take 1 PO BID PRN for pain. Take with food. DO NOT take any other NSAIDs while on this medication. , Disp: 60 capsule, Rfl: 5    doxycycline (DORYX) 100 MG EC tablet, Take 100 mg by mouth 2 (two) times a day, Disp: , Rfl:     escitalopram (LEXAPRO) 20 mg tablet, Take 20 mg by mouth daily, Disp: , Rfl:     Ferrous Sulfate (IRON PO), Take by mouth, Disp: , Rfl:     fluticasone (VERAMYST) 27.5 MCG/SPRAY nasal spray, , Disp: , Rfl:     glimepiride (AMARYL) 4 mg tablet, 2 mg, Disp: , Rfl:     lisinopril (ZESTRIL) 20 mg tablet, Take 20 mg by mouth daily, Disp: , Rfl:     metFORMIN (GLUCOPHAGE) 500 mg tablet, Take 500 mg by mouth 2 (two) times a day with meals, Disp: , Rfl:     Multiple Vitamin (multivitamin) tablet, Take 1 tablet by mouth daily, Disp: , Rfl:     spironolactone (ALDACTONE) 100 mg tablet, Take 100 mg by mouth daily, Disp: , Rfl:     tiZANidine (ZANAFLEX) 2 mg tablet, Take 1 tab in the a.m. and 1.5  PO HS PRN for pain and spasms. , Disp: 75 tablet, Rfl: 5    Current Facility-Administered Medications:     lidocaine (PF) (XYLOCAINE-MPF) 2 % injection 6 mL, 6 mL, Perineural, Once, Darío Walls DO    Allergies   Allergen Reactions    Cefazolin        Physical Exam:   General: Awake, Alert, Oriented x 3, Mood and affect appropriate  Respiratory: Respirations even and unlabored  Cardiovascular: Peripheral pulses intact; no edema  Musculoskeletal Exam: Pain with lumbar extension    ASA Score: III    Patient/Chart Verification  Patient ID Verified: Verbal  ID Band Applied: No  Consents Confirmed: Procedural, To be obtained in the Pre-Procedure area  Interval H&P(within 24 hr) Complete (required for Outpatients and Surgery Admit only): To be obtained in the Pre-Procedure area  Allergies Reviewed: Yes  Anticoag/NSAID held?: NA  Currently on antibiotics?: No  Pregnancy denied?: Yes    Assessment:   1.  Lumbar facet arthropathy        Plan: bilateral L3-L5 MBB #1

## 2023-12-31 DIAGNOSIS — G89.4 CHRONIC PAIN SYNDROME: ICD-10-CM

## 2023-12-31 DIAGNOSIS — M79.18 MYOFASCIAL PAIN SYNDROME: ICD-10-CM

## 2023-12-31 DIAGNOSIS — M47.816 LUMBAR SPONDYLOSIS: ICD-10-CM

## 2024-01-02 NOTE — TELEPHONE ENCOUNTER
Attempted to contact pt. No ans / no vm. Will fu.         NOTE:  celecoxib (CeleBREX) 200 mg capsule [[id number]]    Order Details  Dose, Route, Frequency: As Directed   Dispense Quantity: 60 capsule Refills: 5          Sig: Take 1 PO BID PRN for pain. Take with food.  DO NOT take any other NSAIDs while on this medication.         Start Date: 11/09/23 End Date: --   Written Date: 11/09/23 Expiration Date: 11/08/24       Associated Diagnoses: Lumbar spondylosis [M47.816]; Chronic pain syndrome [G89.4]; Myofascial pain syndrome [M79.18]   Original Order: celecoxib (CeleBREX) 200 mg capsule [[id number]]   Providers    Authorizing Provider:  JENNI Dover     4/29/2023 fu ov

## 2024-01-03 RX ORDER — CELECOXIB 200 MG/1
CAPSULE ORAL
Qty: 60 CAPSULE | Refills: 0 | OUTPATIENT
Start: 2024-01-03

## 2024-01-04 ENCOUNTER — TELEPHONE (OUTPATIENT)
Dept: PAIN MEDICINE | Facility: CLINIC | Age: 59
End: 2024-01-04

## 2024-01-04 DIAGNOSIS — M47.816 LUMBAR SPONDYLOSIS: Primary | ICD-10-CM

## 2024-01-04 NOTE — TELEPHONE ENCOUNTER
Please mail the order for PT. Pt lives in [place], stated that she will look for a practice in her area.     Thank you.

## 2024-01-04 NOTE — TELEPHONE ENCOUNTER
----- Message from Darío Walls DO sent at 1/2/2024  9:26 AM EST -----  Pain diary seems distinctly negative, she had a recent MRI of the lumbar spine?  If not needs to do recent physical therapy/chiropractic treatment and then would entertain if not improved an MRI of the lumbar spine.

## 2024-01-04 NOTE — TELEPHONE ENCOUNTER
S/w pt, advised of above. Pt denied any recent mri's. Advised pt that an mri of her lumbar spine would be appropriate if her low back pain continues however, 6 weeks of PT / chiro in the past 6 months would be ordered first. Pt verbalized understanding and agreement. Requested an order for PT be mailed to her home. Advised pt, the writer will fu. Anticipate an order will arrive sometime next week. Please cb if questions / issues arise.

## 2024-04-29 ENCOUNTER — TELEPHONE (OUTPATIENT)
Age: 59
End: 2024-04-29

## 2024-04-29 ENCOUNTER — OFFICE VISIT (OUTPATIENT)
Dept: PAIN MEDICINE | Facility: CLINIC | Age: 59
End: 2024-04-29
Payer: COMMERCIAL

## 2024-04-29 VITALS
SYSTOLIC BLOOD PRESSURE: 122 MMHG | TEMPERATURE: 97.8 F | BODY MASS INDEX: 39.46 KG/M2 | WEIGHT: 251.4 LBS | HEIGHT: 67 IN | DIASTOLIC BLOOD PRESSURE: 74 MMHG

## 2024-04-29 DIAGNOSIS — M47.816 LUMBAR SPONDYLOSIS: ICD-10-CM

## 2024-04-29 DIAGNOSIS — E66.01 CLASS 2 SEVERE OBESITY WITH SERIOUS COMORBIDITY AND BODY MASS INDEX (BMI) OF 39.0 TO 39.9 IN ADULT, UNSPECIFIED OBESITY TYPE (HCC): ICD-10-CM

## 2024-04-29 DIAGNOSIS — M48.061 FORAMINAL STENOSIS OF LUMBAR REGION: ICD-10-CM

## 2024-04-29 DIAGNOSIS — G89.4 CHRONIC PAIN SYNDROME: ICD-10-CM

## 2024-04-29 DIAGNOSIS — M19.012 LOCALIZED OSTEOARTHRITIS OF LEFT SHOULDER: ICD-10-CM

## 2024-04-29 DIAGNOSIS — M51.26 LUMBAR DISC HERNIATION: ICD-10-CM

## 2024-04-29 DIAGNOSIS — M47.816 LUMBAR FACET ARTHROPATHY: ICD-10-CM

## 2024-04-29 DIAGNOSIS — M79.18 MYOFASCIAL PAIN SYNDROME: ICD-10-CM

## 2024-04-29 PROCEDURE — 99214 OFFICE O/P EST MOD 30 MIN: CPT | Performed by: NURSE PRACTITIONER

## 2024-04-29 RX ORDER — TRAMADOL HYDROCHLORIDE 50 MG/1
TABLET ORAL
COMMUNITY
Start: 2024-01-26

## 2024-04-29 RX ORDER — CELECOXIB 200 MG/1
CAPSULE ORAL
Qty: 60 CAPSULE | Refills: 5 | Status: SHIPPED | OUTPATIENT
Start: 2024-04-29

## 2024-04-29 RX ORDER — AZELASTINE HYDROCHLORIDE 137 UG/1
SPRAY, METERED NASAL
COMMUNITY

## 2024-04-29 RX ORDER — TIZANIDINE 2 MG/1
TABLET ORAL
Qty: 75 TABLET | Refills: 5 | Status: SHIPPED | OUTPATIENT
Start: 2024-04-29

## 2024-04-29 RX ORDER — LORATADINE 10 MG/1
10 TABLET ORAL DAILY
COMMUNITY

## 2024-04-29 RX ORDER — ALBUTEROL SULFATE 90 UG/1
AEROSOL, METERED RESPIRATORY (INHALATION)
COMMUNITY

## 2024-04-29 RX ORDER — LEVOTHYROXINE SODIUM 88 UG/1
TABLET ORAL
COMMUNITY

## 2024-04-29 RX ORDER — IBUPROFEN 800 MG/1
800 TABLET ORAL
COMMUNITY

## 2024-04-29 RX ORDER — NYSTATIN 100000 U/G
CREAM TOPICAL
COMMUNITY

## 2024-04-29 NOTE — PROGRESS NOTES
Assessment:  1. Lumbar disc herniation    2. Lumbar facet arthropathy    3. Foraminal stenosis of lumbar region    4. Myofascial pain syndrome    5. Localized osteoarthritis of left shoulder    6. Chronic pain syndrome        Plan:  The patient is a 58 y.o. female last seen on 12/15/23 who presents for a follow up office visit in regards to chronic pain secondary to low back pain, lumbar disc herniation, lumbar foraminal stenosis and chronic left shoulder pain due to osteoarthritis. The patient presents today with ongoing low back pain.  The pain remains unchanged since the last office visit.  She had underwent a bilateral L3-L5 medial branch block injection, but did not obtain relief.  To help with the ongoing low back pain, I will start her in aqua therapy for 4 to 6 weeks.    She will continue on Celebrex and tizanidine as prescribed, and refills were sent to the pharmacy      The patient will follow-up in 6 months for medication prescription refill and reevaluation. The patient was advised to contact the office should their symptoms worsen in the interim. The patient was agreeable and verbalized an understanding.        History of Present Illness:    The patient is a 58 y.o. female last seen on 12/15/23 who presents for a follow up office visit in regards to chronic pain secondary to low back pain, lumbar disc herniation, lumbar foraminal stenosis and chronic left shoulder pain due to osteoarthritis.  Her last office visit was December 15, 2023, in which she underwent a bilateral L3-L5 medial branch block injection.  Patient presents today with ongoing low back pain.  She also experiences chronic right shoulder pain.  The patient unfortunately did not obtain any relief after a bilateral L3-L5 medial branch block injection, so she was not a candidate for radiofrequency ablation.  She states the low back pain radiates down the lateral aspects of both legs.  It is intermittent but mostly in the morning and at  night.  She describes the pain as throbbing and shooting.  She is rating her pain a 7/10 on the numeric rating scale    She is currently taking Celebrex 200 mg 1 tablet twice a day and tizanidine 2 mg 1 to 2 tablets at bedtime.  The medication provides 20-25% pain relief. Her PCP prescribes tramadol 50 mg 1/2-1 tab at night. She also uses marijuana    She had a lumbar radiofrequency ablation bilateral L3-L5 in 2020 which provided significant relief.  She also had a left shoulder injection in February 2023 which since states today provided 10% pain relief. She states she had lumbar epidural steroid injections at her previous pain management several years ago. They helped initially.     I have personally reviewed and/or updated the patient's past medical history, past surgical history, family history, social history, current medications, allergies, and vital signs today.       Review of Systems:    Review of Systems   Respiratory:  Positive for shortness of breath.    Cardiovascular:  Negative for chest pain.   Gastrointestinal:  Negative for constipation, diarrhea, nausea and vomiting.   Musculoskeletal:  Positive for gait problem. Negative for arthralgias, joint swelling and myalgias.        Decreased ROM  BLE pain  BUE pain     Skin:  Positive for rash.   Neurological:  Negative for dizziness, seizures and weakness.   All other systems reviewed and are negative.        Past Medical History:   Diagnosis Date    Arthritis     Asthma     Diabetes (HCC)     Diabetes mellitus (HCC)     Hypertension        No past surgical history on file.    Family History   Problem Relation Age of Onset    Hypertension Other     Heart disease Other     Cancer Other     Arthritis Mother     Migraines Mother     Osteoporosis Mother     Cancer Father     Diabetes Father     Cancer Maternal Grandfather     Cancer Maternal Grandmother     Diabetes Paternal Grandmother     Mental illness Maternal Uncle        Social History     Occupational  History    Not on file   Tobacco Use    Smoking status: Former     Current packs/day: 0.00     Types: Cigarettes     Quit date:      Years since quittin.3    Smokeless tobacco: Never   Vaping Use    Vaping status: Former    Substances: Nicotine   Substance and Sexual Activity    Alcohol use: Yes    Drug use: Never    Sexual activity: Not Currently     Partners: Male     Birth control/protection: Implant, Injection, Post-menopausal         Current Outpatient Medications:     albuterol (PROVENTIL HFA,VENTOLIN HFA) 90 mcg/act inhaler, INHALE 2 PUFFS BY MOUTH EVERY 4 HOURS AS NEEDED for 16, Disp: , Rfl:     atorvastatin (LIPITOR) 10 mg tablet, Take 10 mg by mouth daily, Disp: , Rfl:     Azelastine HCl 137 MCG/SPRAY SOLN, USE 1 SPRAY IN EACH NOSTRIL TWICE A DAY for 30, Disp: , Rfl:     celecoxib (CeleBREX) 200 mg capsule, Take 1 PO BID PRN for pain. Take with food.  DO NOT take any other NSAIDs while on this medication., Disp: 60 capsule, Rfl: 5    doxycycline (DORYX) 100 MG EC tablet, Take 100 mg by mouth 2 (two) times a day, Disp: , Rfl:     escitalopram (LEXAPRO) 20 mg tablet, Take 20 mg by mouth daily, Disp: , Rfl:     Ferrous Sulfate (IRON PO), Take by mouth, Disp: , Rfl:     fluticasone (VERAMYST) 27.5 MCG/SPRAY nasal spray, , Disp: , Rfl:     glimepiride (AMARYL) 4 mg tablet, 2 mg, Disp: , Rfl:     ibuprofen (MOTRIN) 800 mg tablet, Take 800 mg by mouth, Disp: , Rfl:     levothyroxine 88 mcg tablet, TAKE 1 TAB IN THE MORNING ON AN EMPTY STOMACH ONCE A DAY, Disp: , Rfl:     lisinopril (ZESTRIL) 20 mg tablet, Take 20 mg by mouth daily, Disp: , Rfl:     loratadine (CLARITIN) 10 mg tablet, Take 10 mg by mouth daily, Disp: , Rfl:     metFORMIN (GLUCOPHAGE) 500 mg tablet, Take 500 mg by mouth 2 (two) times a day with meals, Disp: , Rfl:     Multiple Vitamin (multivitamin) tablet, Take 1 tablet by mouth daily, Disp: , Rfl:     Mupirocin 2 % KIT, USE 1 APPLICATION TO AFFECTED AREA THREE TIMES A DAY EXTERNALLY FOR  "10 DAYS for 10, Disp: , Rfl:     nystatin (MYCOSTATIN) cream, 1 APPLICATION TO AFFECTED AREA EXTERNALLY TWICE A DAY 14 DAYS, Disp: , Rfl:     spironolactone (ALDACTONE) 100 mg tablet, Take 100 mg by mouth daily, Disp: , Rfl:     tiZANidine (ZANAFLEX) 2 mg tablet, Take 1 tab in the a.m. and 1.5  PO HS PRN for pain and spasms., Disp: 75 tablet, Rfl: 5    traMADol (ULTRAM) 50 mg tablet, 1/2-1 tablet Orally at bedtime prn for 30 days, Disp: , Rfl:     Allergies   Allergen Reactions    Cefazolin        Physical Exam:    Ht 5' 7\" (1.702 m) Comment: verbal  BMI 38.84 kg/m²     Constitutional:normal, well developed, well nourished, alert, in no distress and non-toxic and no overt pain behavior.  Eyes:anicteric  HEENT:grossly intact  Neck:supple, symmetric, trachea midline and no masses   Pulmonary:even and unlabored  Cardiovascular:No edema or pitting edema present  Skin:Normal without rashes or lesions and well hydrated  Psychiatric:Mood and affect appropriate  Neurologic:Cranial Nerves II-XII grossly intact  Musculoskeletal:normal      Imaging            No orders to display         No orders of the defined types were placed in this encounter.      "

## 2024-04-29 NOTE — TELEPHONE ENCOUNTER
Caller: Patient     Doctor: Maxine    Reason for call: Patient discussed with provider today medical marijuana card. Wanting to know if the office has the paperwork or what her next steps would be to pursue getting her card.     If so she would like the forms emailed to the email on file     Please advise     Call back#: [phone]

## 2024-07-18 NOTE — TELEPHONE ENCOUNTER
Caller: carey Martinez    Doctor: Maxine    Reason for call: pt returning the nurse's call    Call back#: [phone]    (4) no impairment